# Patient Record
Sex: FEMALE | Race: OTHER | Employment: UNEMPLOYED | URBAN - METROPOLITAN AREA
[De-identification: names, ages, dates, MRNs, and addresses within clinical notes are randomized per-mention and may not be internally consistent; named-entity substitution may affect disease eponyms.]

---

## 2020-07-04 ENCOUNTER — HOSPITAL ENCOUNTER (INPATIENT)
Age: 40
LOS: 3 days | Discharge: HOME | DRG: 177 | End: 2020-07-07
Attending: EMERGENCY MEDICINE | Admitting: INTERNAL MEDICINE
Payer: SELF-PAY

## 2020-07-04 ENCOUNTER — APPOINTMENT (OUTPATIENT)
Dept: GENERAL RADIOLOGY | Age: 40
DRG: 177 | End: 2020-07-04
Attending: PHYSICIAN ASSISTANT
Payer: SELF-PAY

## 2020-07-04 DIAGNOSIS — U07.1 COVID-19 VIRUS INFECTION: ICD-10-CM

## 2020-07-04 DIAGNOSIS — Z20.822 SUSPECTED COVID-19 VIRUS INFECTION: Primary | ICD-10-CM

## 2020-07-04 DIAGNOSIS — R09.02 HYPOXIA: ICD-10-CM

## 2020-07-04 LAB
ALBUMIN SERPL-MCNC: 3.6 G/DL (ref 3.5–5)
ALBUMIN/GLOB SERPL: 0.8 {RATIO} (ref 1.1–2.2)
ALP SERPL-CCNC: 244 U/L (ref 45–117)
ALT SERPL-CCNC: 105 U/L (ref 12–78)
ANION GAP SERPL CALC-SCNC: 6 MMOL/L (ref 5–15)
APTT PPP: 30.2 SEC (ref 22.1–32)
AST SERPL-CCNC: 75 U/L (ref 15–37)
BASOPHILS # BLD: 0 K/UL (ref 0–0.1)
BASOPHILS NFR BLD: 0 % (ref 0–1)
BILIRUB SERPL-MCNC: 0.4 MG/DL (ref 0.2–1)
BUN SERPL-MCNC: 8 MG/DL (ref 6–20)
BUN/CREAT SERPL: 11 (ref 12–20)
CALCIUM SERPL-MCNC: 8.3 MG/DL (ref 8.5–10.1)
CHLORIDE SERPL-SCNC: 105 MMOL/L (ref 97–108)
CO2 SERPL-SCNC: 27 MMOL/L (ref 21–32)
COMMENT, HOLDF: NORMAL
COVID-19 RAPID TEST, COVR: DETECTED
CREAT SERPL-MCNC: 0.73 MG/DL (ref 0.55–1.02)
CRP SERPL-MCNC: 11.7 MG/DL (ref 0–0.6)
D DIMER PPP FEU-MCNC: 0.93 MG/L FEU (ref 0–0.65)
DIFFERENTIAL METHOD BLD: ABNORMAL
EOSINOPHIL # BLD: 0 K/UL (ref 0–0.4)
EOSINOPHIL NFR BLD: 0 % (ref 0–7)
ERYTHROCYTE [DISTWIDTH] IN BLOOD BY AUTOMATED COUNT: 12.9 % (ref 11.5–14.5)
FERRITIN SERPL-MCNC: 265 NG/ML (ref 26–388)
FIBRINOGEN PPP-MCNC: 574 MG/DL (ref 200–475)
GLOBULIN SER CALC-MCNC: 4.4 G/DL (ref 2–4)
GLUCOSE SERPL-MCNC: 117 MG/DL (ref 65–100)
HCT VFR BLD AUTO: 44.1 % (ref 35–47)
HGB BLD-MCNC: 13.9 G/DL (ref 11.5–16)
IMM GRANULOCYTES # BLD AUTO: 0.1 K/UL (ref 0–0.04)
IMM GRANULOCYTES NFR BLD AUTO: 1 % (ref 0–0.5)
INR PPP: 1 (ref 0.9–1.1)
LACTATE SERPL-SCNC: 0.8 MMOL/L (ref 0.4–2)
LDH SERPL L TO P-CCNC: 306 U/L (ref 81–246)
LYMPHOCYTES # BLD: 1.2 K/UL (ref 0.8–3.5)
LYMPHOCYTES NFR BLD: 13 % (ref 12–49)
MAGNESIUM SERPL-MCNC: 2.2 MG/DL (ref 1.6–2.4)
MCH RBC QN AUTO: 28.7 PG (ref 26–34)
MCHC RBC AUTO-ENTMCNC: 31.5 G/DL (ref 30–36.5)
MCV RBC AUTO: 91.1 FL (ref 80–99)
MONOCYTES # BLD: 0.5 K/UL (ref 0–1)
MONOCYTES NFR BLD: 5 % (ref 5–13)
NEUTS SEG # BLD: 7.6 K/UL (ref 1.8–8)
NEUTS SEG NFR BLD: 81 % (ref 32–75)
NRBC # BLD: 0 K/UL (ref 0–0.01)
NRBC BLD-RTO: 0 PER 100 WBC
PLATELET # BLD AUTO: 305 K/UL (ref 150–400)
PMV BLD AUTO: 8.8 FL (ref 8.9–12.9)
POTASSIUM SERPL-SCNC: 3.5 MMOL/L (ref 3.5–5.1)
PROT SERPL-MCNC: 8 G/DL (ref 6.4–8.2)
PROTHROMBIN TIME: 10.3 SEC (ref 9–11.1)
RBC # BLD AUTO: 4.84 M/UL (ref 3.8–5.2)
SAMPLES BEING HELD,HOLD: NORMAL
SODIUM SERPL-SCNC: 138 MMOL/L (ref 136–145)
SOURCE, COVRS: ABNORMAL
SPECIMEN SOURCE, FCOV2M: ABNORMAL
THERAPEUTIC RANGE,PTTT: NORMAL SECS (ref 58–77)
TROPONIN I SERPL-MCNC: <0.05 NG/ML
WBC # BLD AUTO: 9.3 K/UL (ref 3.6–11)

## 2020-07-04 PROCEDURE — 85384 FIBRINOGEN ACTIVITY: CPT

## 2020-07-04 PROCEDURE — 80053 COMPREHEN METABOLIC PANEL: CPT

## 2020-07-04 PROCEDURE — 99284 EMERGENCY DEPT VISIT MOD MDM: CPT

## 2020-07-04 PROCEDURE — 71045 X-RAY EXAM CHEST 1 VIEW: CPT

## 2020-07-04 PROCEDURE — 85610 PROTHROMBIN TIME: CPT

## 2020-07-04 PROCEDURE — 85025 COMPLETE CBC W/AUTO DIFF WBC: CPT

## 2020-07-04 PROCEDURE — 85730 THROMBOPLASTIN TIME PARTIAL: CPT

## 2020-07-04 PROCEDURE — 83615 LACTATE (LD) (LDH) ENZYME: CPT

## 2020-07-04 PROCEDURE — 85379 FIBRIN DEGRADATION QUANT: CPT

## 2020-07-04 PROCEDURE — 93005 ELECTROCARDIOGRAM TRACING: CPT

## 2020-07-04 PROCEDURE — 84484 ASSAY OF TROPONIN QUANT: CPT

## 2020-07-04 PROCEDURE — 86140 C-REACTIVE PROTEIN: CPT

## 2020-07-04 PROCEDURE — 87635 SARS-COV-2 COVID-19 AMP PRB: CPT

## 2020-07-04 PROCEDURE — 36415 COLL VENOUS BLD VENIPUNCTURE: CPT

## 2020-07-04 PROCEDURE — 83735 ASSAY OF MAGNESIUM: CPT

## 2020-07-04 PROCEDURE — 65270000029 HC RM PRIVATE

## 2020-07-04 PROCEDURE — 83605 ASSAY OF LACTIC ACID: CPT

## 2020-07-04 PROCEDURE — 82728 ASSAY OF FERRITIN: CPT

## 2020-07-04 RX ORDER — ACETAMINOPHEN 650 MG/1
650 SUPPOSITORY RECTAL
Status: DISCONTINUED | OUTPATIENT
Start: 2020-07-04 | End: 2020-07-07 | Stop reason: HOSPADM

## 2020-07-04 RX ORDER — SODIUM CHLORIDE 0.9 % (FLUSH) 0.9 %
5-40 SYRINGE (ML) INJECTION EVERY 8 HOURS
Status: DISCONTINUED | OUTPATIENT
Start: 2020-07-04 | End: 2020-07-07 | Stop reason: HOSPADM

## 2020-07-04 RX ORDER — ACETAMINOPHEN 325 MG/1
650 TABLET ORAL
Status: DISCONTINUED | OUTPATIENT
Start: 2020-07-04 | End: 2020-07-07 | Stop reason: HOSPADM

## 2020-07-04 RX ORDER — SODIUM CHLORIDE 0.9 % (FLUSH) 0.9 %
5-40 SYRINGE (ML) INJECTION AS NEEDED
Status: DISCONTINUED | OUTPATIENT
Start: 2020-07-04 | End: 2020-07-07 | Stop reason: HOSPADM

## 2020-07-05 LAB
ALBUMIN SERPL-MCNC: 3 G/DL (ref 3.5–5)
ALBUMIN/GLOB SERPL: 0.7 {RATIO} (ref 1.1–2.2)
ALP SERPL-CCNC: 210 U/L (ref 45–117)
ALT SERPL-CCNC: 86 U/L (ref 12–78)
ANION GAP SERPL CALC-SCNC: 8 MMOL/L (ref 5–15)
APTT PPP: 31.3 SEC (ref 22.1–32)
AST SERPL-CCNC: 56 U/L (ref 15–37)
BASOPHILS # BLD: 0 K/UL (ref 0–0.1)
BASOPHILS NFR BLD: 0 % (ref 0–1)
BILIRUB SERPL-MCNC: 0.4 MG/DL (ref 0.2–1)
BUN SERPL-MCNC: 6 MG/DL (ref 6–20)
BUN/CREAT SERPL: 11 (ref 12–20)
CALCIUM SERPL-MCNC: 8.3 MG/DL (ref 8.5–10.1)
CHLORIDE SERPL-SCNC: 107 MMOL/L (ref 97–108)
CK SERPL-CCNC: 61 U/L (ref 26–192)
CO2 SERPL-SCNC: 23 MMOL/L (ref 21–32)
CREAT SERPL-MCNC: 0.55 MG/DL (ref 0.55–1.02)
D DIMER PPP FEU-MCNC: 0.69 MG/L FEU (ref 0–0.65)
DIFFERENTIAL METHOD BLD: ABNORMAL
EOSINOPHIL # BLD: 0 K/UL (ref 0–0.4)
EOSINOPHIL NFR BLD: 0 % (ref 0–7)
ERYTHROCYTE [DISTWIDTH] IN BLOOD BY AUTOMATED COUNT: 12.9 % (ref 11.5–14.5)
FERRITIN SERPL-MCNC: 231 NG/ML (ref 26–388)
FIBRINOGEN PPP-MCNC: 502 MG/DL (ref 200–475)
GLOBULIN SER CALC-MCNC: 4.1 G/DL (ref 2–4)
GLUCOSE SERPL-MCNC: 100 MG/DL (ref 65–100)
HCT VFR BLD AUTO: 38.8 % (ref 35–47)
HGB BLD-MCNC: 12.4 G/DL (ref 11.5–16)
IMM GRANULOCYTES # BLD AUTO: 0 K/UL (ref 0–0.04)
IMM GRANULOCYTES NFR BLD AUTO: 1 % (ref 0–0.5)
INR PPP: 1 (ref 0.9–1.1)
LYMPHOCYTES # BLD: 1.3 K/UL (ref 0.8–3.5)
LYMPHOCYTES NFR BLD: 19 % (ref 12–49)
MCH RBC QN AUTO: 29.2 PG (ref 26–34)
MCHC RBC AUTO-ENTMCNC: 32 G/DL (ref 30–36.5)
MCV RBC AUTO: 91.5 FL (ref 80–99)
MONOCYTES # BLD: 0.4 K/UL (ref 0–1)
MONOCYTES NFR BLD: 5 % (ref 5–13)
NEUTS SEG # BLD: 5.2 K/UL (ref 1.8–8)
NEUTS SEG NFR BLD: 75 % (ref 32–75)
NRBC # BLD: 0 K/UL (ref 0–0.01)
NRBC BLD-RTO: 0 PER 100 WBC
PLATELET # BLD AUTO: 279 K/UL (ref 150–400)
PMV BLD AUTO: 8.7 FL (ref 8.9–12.9)
POTASSIUM SERPL-SCNC: 3.6 MMOL/L (ref 3.5–5.1)
PROCALCITONIN SERPL-MCNC: 0.24 NG/ML
PROT SERPL-MCNC: 7.1 G/DL (ref 6.4–8.2)
PROTHROMBIN TIME: 10.5 SEC (ref 9–11.1)
RBC # BLD AUTO: 4.24 M/UL (ref 3.8–5.2)
SODIUM SERPL-SCNC: 138 MMOL/L (ref 136–145)
THERAPEUTIC RANGE,PTTT: NORMAL SECS (ref 58–77)
WBC # BLD AUTO: 7 K/UL (ref 3.6–11)

## 2020-07-05 PROCEDURE — 85384 FIBRINOGEN ACTIVITY: CPT

## 2020-07-05 PROCEDURE — 85610 PROTHROMBIN TIME: CPT

## 2020-07-05 PROCEDURE — 65270000029 HC RM PRIVATE

## 2020-07-05 PROCEDURE — 74011250637 HC RX REV CODE- 250/637: Performed by: INTERNAL MEDICINE

## 2020-07-05 PROCEDURE — 84145 PROCALCITONIN (PCT): CPT

## 2020-07-05 PROCEDURE — 80053 COMPREHEN METABOLIC PANEL: CPT

## 2020-07-05 PROCEDURE — 74011250636 HC RX REV CODE- 250/636: Performed by: HOSPITALIST

## 2020-07-05 PROCEDURE — 82728 ASSAY OF FERRITIN: CPT

## 2020-07-05 PROCEDURE — 36415 COLL VENOUS BLD VENIPUNCTURE: CPT

## 2020-07-05 PROCEDURE — 85730 THROMBOPLASTIN TIME PARTIAL: CPT

## 2020-07-05 PROCEDURE — 74011250636 HC RX REV CODE- 250/636: Performed by: INTERNAL MEDICINE

## 2020-07-05 PROCEDURE — 85025 COMPLETE CBC W/AUTO DIFF WBC: CPT

## 2020-07-05 PROCEDURE — 85379 FIBRIN DEGRADATION QUANT: CPT

## 2020-07-05 PROCEDURE — 82550 ASSAY OF CK (CPK): CPT

## 2020-07-05 RX ORDER — ZINC SULFATE 50(220)MG
1 CAPSULE ORAL DAILY
Status: DISCONTINUED | OUTPATIENT
Start: 2020-07-05 | End: 2020-07-07 | Stop reason: HOSPADM

## 2020-07-05 RX ORDER — ENOXAPARIN SODIUM 100 MG/ML
30 INJECTION SUBCUTANEOUS EVERY 12 HOURS
Status: DISCONTINUED | OUTPATIENT
Start: 2020-07-05 | End: 2020-07-07 | Stop reason: HOSPADM

## 2020-07-05 RX ORDER — ASCORBIC ACID 500 MG
500 TABLET ORAL 2 TIMES DAILY
Status: DISCONTINUED | OUTPATIENT
Start: 2020-07-05 | End: 2020-07-07 | Stop reason: HOSPADM

## 2020-07-05 RX ADMIN — Medication 10 ML: at 01:26

## 2020-07-05 RX ADMIN — ENOXAPARIN SODIUM 30 MG: 30 INJECTION SUBCUTANEOUS at 08:57

## 2020-07-05 RX ADMIN — ENOXAPARIN SODIUM 30 MG: 30 INJECTION SUBCUTANEOUS at 19:31

## 2020-07-05 RX ADMIN — ACETAMINOPHEN 650 MG: 325 TABLET ORAL at 22:28

## 2020-07-05 RX ADMIN — Medication 10 ML: at 22:29

## 2020-07-05 RX ADMIN — Medication 500 MG: at 17:11

## 2020-07-05 RX ADMIN — Medication 10 ML: at 14:41

## 2020-07-05 RX ADMIN — ACETAMINOPHEN 650 MG: 325 TABLET ORAL at 06:41

## 2020-07-05 RX ADMIN — ACETAMINOPHEN 650 MG: 325 TABLET ORAL at 12:19

## 2020-07-05 RX ADMIN — Medication 10 ML: at 06:41

## 2020-07-05 RX ADMIN — Medication 500 MG: at 08:57

## 2020-07-05 RX ADMIN — AZITHROMYCIN MONOHYDRATE 500 MG: 500 INJECTION, POWDER, LYOPHILIZED, FOR SOLUTION INTRAVENOUS at 01:26

## 2020-07-05 RX ADMIN — ACETAMINOPHEN 650 MG: 325 TABLET ORAL at 01:54

## 2020-07-05 RX ADMIN — ZINC SULFATE 220 MG (50 MG) CAPSULE 1 CAPSULE: CAPSULE at 08:57

## 2020-07-05 NOTE — PROGRESS NOTES
Problem: Pain - Acute  Goal: *Ability to perform ADLs and demonstrates progressive mobility and function  Outcome: Progressing Towards Goal     Problem: Gas Exchange - Impaired  Goal: Absence of hypoxia  Outcome: Progressing Towards Goal     Problem: Breathing Pattern - Ineffective  Goal: Ability to achieve and maintain a regular respiratory rate  Outcome: Progressing Towards Goal     Problem:  Body Temperature -  Risk of, Imbalanced  Goal: Ability to maintain a body temperature within defined limits  Outcome: Progressing Towards Goal     Problem: Loneliness or Risk for Loneliness  Goal: Demonstrate positive use of time alone when socialization is not possible  Outcome: Progressing Towards Goal     Problem: Fatigue  Goal: Verbalize increase energy and improved vitality  Outcome: Progressing Towards Goal

## 2020-07-05 NOTE — ED PROVIDER NOTES
Janeen Bell is a 38 y.o. female  who presents by private vehicle to ER with c/o Patient presents with:  Shortness of Breath  Fatigue  Patient presents with complaints of shortness of breath and fatigue. Patient states that 8 days ago with intermittent fever, fatigue and dizziness. Patient recently has been around her mother who was discharged from hospital for [place]. She specifically denies any fevers, chills, nausea, vomiting, chest pain,  headache, rash, diarrhea, abdominal pain, urinary/bowel changes, sweating or weight loss. PCP: No primary care provider on file. PMHx significant for: No past medical history on file. PSHx significant for: No past surgical history on file. Social Hx: Tobacco use: Social History    Tobacco Use      Smoking status: Never Smoker      Smokeless tobacco: Never Used  ; EtOH use: The patient states she drinks 0 per week.; Illicit Drug use: Allergies:  No Known Allergies    There are no other complaints, changes or physical findings at this time. No past medical history on file. No past surgical history on file. No family history on file.     Social History     Socioeconomic History    Marital status: Not on file     Spouse name: Not on file    Number of children: Not on file    Years of education: Not on file    Highest education level: Not on file   Occupational History    Not on file   Social Needs    Financial resource strain: Not on file    Food insecurity     Worry: Not on file     Inability: Not on file    Transportation needs     Medical: Not on file     Non-medical: Not on file   Tobacco Use    Smoking status: Never Smoker    Smokeless tobacco: Never Used   Substance and Sexual Activity    Alcohol use: Not Currently    Drug use: Not Currently    Sexual activity: Not on file   Lifestyle    Physical activity     Days per week: Not on file     Minutes per session: Not on file    Stress: Not on file   Relationships    Social connections     Talks on phone: Not on file     Gets together: Not on file     Attends Yarsani service: Not on file     Active member of club or organization: Not on file     Attends meetings of clubs or organizations: Not on file     Relationship status: Not on file    Intimate partner violence     Fear of current or ex partner: Not on file     Emotionally abused: Not on file     Physically abused: Not on file     Forced sexual activity: Not on file   Other Topics Concern    Not on file   Social History Narrative    Not on file         ALLERGIES: Patient has no known allergies. Review of Systems   Constitutional: Positive for activity change and fatigue. Negative for chills and fever. HENT: Negative for congestion, rhinorrhea and sore throat. Respiratory: Positive for cough and shortness of breath. Cardiovascular: Negative for chest pain and leg swelling. Gastrointestinal: Negative for abdominal pain, diarrhea, nausea and vomiting. Genitourinary: Negative for dysuria, vaginal bleeding and vaginal discharge. Musculoskeletal: Positive for myalgias. Negative for arthralgias. Neurological: Negative for dizziness. Psychiatric/Behavioral: Negative for confusion. All other systems reviewed and are negative. Vitals:    07/04/20 2137   BP: 105/70   Pulse: 82   Resp: 24   Temp: 97.7 °F (36.5 °C)   SpO2: 90%            Physical Exam  Constitutional:       Appearance: Normal appearance. She is well-developed. HENT:      Head: Normocephalic and atraumatic. Right Ear: External ear normal.      Left Ear: External ear normal.      Nose: Nose normal.      Mouth/Throat:      Pharynx: No oropharyngeal exudate. Eyes:      General: Lids are normal.         Right eye: No discharge. Left eye: No discharge. Conjunctiva/sclera: Conjunctivae normal.   Neck:      Musculoskeletal: Normal range of motion. Thyroid: No thyromegaly. Trachea: No tracheal deviation. Cardiovascular:      Rate and Rhythm: Normal rate and regular rhythm. Heart sounds: Normal heart sounds. Pulmonary:      Effort: Pulmonary effort is normal. Tachypnea present. No respiratory distress or retractions. Breath sounds: Normal breath sounds. No stridor or transmitted upper airway sounds. No decreased breath sounds or wheezing. Abdominal:      General: Bowel sounds are normal.      Palpations: Abdomen is soft. Musculoskeletal: Normal range of motion. Skin:     General: Skin is warm and dry. Neurological:      Mental Status: She is alert and oriented to person, place, and time. Psychiatric:         Judgment: Judgment normal.          MDM       Procedures                   Hospitalist Perfect Serve for Admission  10:57 PM    ED Room Number: ER14/14  Patient Name and age:  Ethel Arvizu 38 y.o.  female  Working Diagnosis:   1. Suspected COVID-19 virus infection    2.  Hypoxia        COVID-19 Suspicion:  yes    Code Status:  Full Code  Readmission: no  Isolation Requirements:  yes  Recommended Level of Care:  telemetry  Department:[facility] Adult ED - 21   Other:  Rapid test, COVID positive, patient diaphoretic and hypoxic on arrival.

## 2020-07-05 NOTE — ED TRIAGE NOTES
Triage:  Pt to ED due to worsening SOB and fatigue. Pt states the SOB has been ongoing for 8 days. Pt states intermittent fever, fatigue, and dizziness. Pt states she has recently been around her mother who was recently discharged from the hospital who was diagnosed with Covid 19 on admission.

## 2020-07-05 NOTE — PROGRESS NOTES
[facility] Adult  Hospitalist Group                                                                                          Hospitalist Progress Note  Katt Varma MD  Answering service: [phone] from in house phone        Date of Service:  2020  NAME:  Dorann Halsted  :  1980  MRN:  [id number]      Admission Summary:     Dorann Halsted is a 38 y.o. female with no significant past medical history presented emergency room complaining of shortness of breath, increased fatigue chills and chest heaviness for the last 24 hours. Patient denies any fever, nausea, vomiting or abdominal pain. 8 days. Patient also reports having non-productive cough that has worsened. Her mother was recently diagnosed with COVID-19 infection. Patient came to the emergency room today for evaluation and she was found to be hypoxic with O2 sats of 91% on room air and dropped to the 80s with ambulation. She had a rapid coffee test in the ED that was positive. On chest x-ray she was found to have some opacities suggestive of pneumonia. Also  has been having similar symptoms. He is currently in the ED to be evaluated. Interval history / Subjective:     She said she has exertional dyspnea and dry cough, no chest pain     Assessment & Plan:     Acute hypoxic respiratory failure due to pneumonia, COVID-19 infection  -COVID-19 test positive  -continue ceftriaxone, azithromycin, vitamin c and zinc, monitor pulse ox  -on droplet and contact isolation  -SpO2 96-99% on RA  -chest x ray diffuse interstitial infiltrates. Consider atypical viral processes.   -afebrile, no leukocytosis   -troponin and ferritin normal  -d dimer 0.69, fibrinogen 502, CRP 11.7,   -continue monitoring pulse ox            Code status: Full Code   DVT prophylaxis: Lovenox    Care Plan discussed with: Patient/Family and Nurse  Anticipated Disposition: Home w/Family  Anticipated Discharge: 24 hours to 50 hours     Hospital Problems  Never Reviewed          Codes Class Noted POA    Hypoxia ICD-10-CM: R09.02  ICD-9-CM: 799.02  7/4/2020 Unknown        COVID-19 virus infection ICD-10-CM: U07.1  ICD-9-CM: 079.89  7/4/2020 Unknown              Vital Signs:    Last 24hrs VS reviewed since prior progress note. Most recent are:  Visit Vitals  /67 (BP 1 Location: Left arm, BP Patient Position: At rest)   Pulse 69   Temp 99.1 °F (37.3 °C)   Resp 22   Wt 68.5 kg (151 lb)   SpO2 96%       No intake or output data in the 24 hours ending 07/05/20 1243     Physical Examination:             Constitutional:  No acute distress, cooperative, pleasant    ENT:  Oral mucosa moist, oropharynx benign. Resp:  Decrease bronchial breath sound bilaterally. No wheezing/rhonchi/rales. No accessory muscle use   CV:  Regular rhythm, normal rate, no murmurs, gallops, rubs    GI:  Soft, non distended, non tender. normoactive bowel sounds, no hepatosplenomegaly     Musculoskeletal:  No edema     Neurologic:  Moves all extremities.   AAOx3, CN II-XII reviewed     Skin:  Good turgor, no rashes or ulcers       Data Review:    Review and/or order of clinical lab test  Review and/or order of tests in the radiology section of CPT  Review and/or order of tests in the medicine section of CPT      Labs:     Recent Labs     07/05/20 0413 07/04/20 2237   WBC 7.0 9.3   HGB 12.4 13.9   HCT 38.8 44.1    305     Recent Labs     07/05/20 0413 07/04/20 2237    138   K 3.6 3.5    105   CO2 23 27   BUN 6 8   CREA 0.55 0.73    117*   CA 8.3* 8.3*   MG  --  2.2     Recent Labs     07/05/20 0413 07/04/20 2237   ALT 86* 105*   * 244*   TBILI 0.4 0.4   TP 7.1 8.0   ALB 3.0* 3.6   GLOB 4.1* 4.4*     Recent Labs     07/05/20 0413 07/04/20 2237   INR 1.0 1.0   PTP 10.5 10.3   APTT 31.3 30.2      Recent Labs     07/05/20  0413 07/04/20  2237   FERR 231 265      No results found for: FOL, RBCF   No results for input(s): PH, PCO2, PO2 in the last 72 hours.   Recent Labs     07/05/20  0413 07/04/20  4787   CPK 61  --    TROIQ  --  <0.05     No results found for: CHOL, CHOLX, CHLST, CHOLV, HDL, HDLP, LDL, LDLC, DLDLP, TGLX, TRIGL, TRIGP, CHHD, CHHDX  No results found for: GLUCPOC  No results found for: COLOR, APPRN, SPGRU, REFSG, ALAN, PROTU, GLUCU, KETU, BILU, UROU, AMARA, LEUKU, GLUKE, EPSU, BACTU, WBCU, RBCU, CASTS, UCRY      Medications Reviewed:     Current Facility-Administered Medications   Medication Dose Route Frequency    azithromycin (ZITHROMAX) 500 mg in 0.9% sodium chloride (MBP/ADV) 250 mL  500 mg IntraVENous Q24H    ascorbic acid (vitamin C) (VITAMIN C) tablet 500 mg  500 mg Oral BID    zinc sulfate (ZINCATE) 220 (50) mg capsule 1 Cap  1 Cap Oral DAILY    enoxaparin (LOVENOX) injection 30 mg  30 mg SubCUTAneous Q12H    sodium chloride (NS) flush 5-40 mL  5-40 mL IntraVENous Q8H    sodium chloride (NS) flush 5-40 mL  5-40 mL IntraVENous PRN    acetaminophen (TYLENOL) tablet 650 mg  650 mg Oral Q6H PRN    Or    acetaminophen (TYLENOL) suppository 650 mg  650 mg Rectal Q6H PRN     ______________________________________________________________________  EXPECTED LENGTH OF STAY: - - -  ACTUAL LENGTH OF STAY:          1                 Brijesh Klein MD

## 2020-07-05 NOTE — H&P
[address] [facility] Adult  Hospitalist Group  History and Physical    Primary Care Provider: None  Date of Service:  7/5/2020    Subjective:     Zeenat Gaxiola is a 38 y.o. female with no significant past medical history presented emergency room complaining of shortness of breath, increased fatigue chills and chest heaviness for the last 24 hours. Patient denies any fever, nausea, vomiting or abdominal pain. 8 days. Patient also reports having non-productive cough that has worsened. Her mother was recently diagnosed with COVID-19 infection. Patient came to the emergency room today for evaluation and she was found to be hypoxic with O2 sats of 91% on room air and dropped to the 80s with ambulation. She had a rapid coffee test in the ED that was positive. On chest x-ray she was found to have some opacities suggestive of pneumonia. Also  has been having similar symptoms. He is currently in the ED to be evaluated. Review of Systems:    Review of Systems   Constitutional: Positive for chills and malaise/fatigue. Negative for fever and weight loss. HENT: Negative for congestion, ear discharge and hearing loss. Eyes: Negative for blurred vision and double vision. Respiratory: Positive for cough and shortness of breath. Negative for sputum production and wheezing. Cardiovascular: Positive for chest pain. Negative for palpitations, orthopnea, leg swelling and PND. Gastrointestinal: Negative for abdominal pain, constipation, diarrhea, melena, nausea and vomiting. Genitourinary: Negative for dysuria, frequency and urgency. Musculoskeletal: Negative for back pain, joint pain and myalgias. Skin: Negative for itching and rash. Neurological: Negative for dizziness, sensory change, speech change, focal weakness, weakness and headaches. Endo/Heme/Allergies: Negative for polydipsia. Does not bruise/bleed easily.        Past medical history: None    Past surgical history: None    Home medication: None    No Known Allergies     Family medical history: None except for mom who was just recently diagnosed with COVID-19 infection. SOCIAL HISTORY:  Patient resides at home with . Patient ambulates independently. Smoking history: Never. Alcohol history: None. Objective:       Physical Exam:   Patient Vitals for the past 12 hrs:   Temp Pulse Resp BP SpO2   07/05/20 0142 98.9 °F (37.2 °C) 75 24 123/77 96 %   07/05/20 0030  71 26 121/69 99 %   07/05/20 0000  92 23 112/76 (!) 89 %   07/04/20 2330  77 28 116/74 91 %   07/04/20 2300  77 28 110/73 91 %   07/04/20 2137 97.7 °F (36.5 °C) 82 24 105/70 90 %     GEN APPEARANCE: Patient resting in bed in NAD  HEENT: Conjunctiva Clear  CVS: RRR, S1, S2; No M/G/R  LUNGS: Diffuse ronchi. Labored breathing. ABD: Soft; No TTP; + Normoactive BS  EXT: WWP, no edema   Skin exam: No gross lesions noted on exposed skin surfaces  MENTAL STATUS: Answers questions appropriately, responds to commands.    NEURO:  No gross motor or sensory deficits      Data Review:   Recent Results (from the past 24 hour(s))   EKG, 12 LEAD, INITIAL    Collection Time: 07/04/20 10:28 PM   Result Value Ref Range    Ventricular Rate 85 BPM    Atrial Rate 85 BPM    P-R Interval 156 ms    QRS Duration 94 ms    Q-T Interval 388 ms    QTC Calculation (Bezet) 461 ms    Calculated P Axis 38 degrees    Calculated R Axis 78 degrees    Calculated T Axis 9 degrees    Diagnosis       Normal sinus rhythm  Nonspecific T wave abnormality  Prolonged QT  No previous ECGs available     SARS-COV-2    Collection Time: 07/04/20 10:37 PM   Result Value Ref Range    Specimen source Nasopharyngeal      Specimen source Nasopharyngeal      COVID-19 rapid test Detected (A) NOTD     LACTIC ACID    Collection Time: 07/04/20 10:37 PM   Result Value Ref Range    Lactic acid 0.8 0.4 - 2.0 MMOL/L   TROPONIN I    Collection Time: 07/04/20 10:37 PM   Result Value Ref Range    Troponin-I, Qt. <0.05 <0.05 ng/mL   D DIMER    Collection Time: 07/04/20 10:37 PM   Result Value Ref Range    D-dimer 0.93 (H) 0.00 - 0.65 mg/L FEU   C REACTIVE PROTEIN, QT    Collection Time: 07/04/20 10:37 PM   Result Value Ref Range    C-Reactive protein 11.70 (H) 0.00 - 0.60 mg/dL   LD    Collection Time: 07/04/20 10:37 PM   Result Value Ref Range     (H) 81 - 246 U/L   FERRITIN    Collection Time: 07/04/20 10:37 PM   Result Value Ref Range    Ferritin 265 26 - 388 NG/ML   FIBRINOGEN    Collection Time: 07/04/20 10:37 PM   Result Value Ref Range    Fibrinogen 574 (H) 200 - 475 mg/dL   PTT    Collection Time: 07/04/20 10:37 PM   Result Value Ref Range    aPTT 30.2 22.1 - 32.0 sec    aPTT, therapeutic range     58.0 - 77.0 SECS   PROTHROMBIN TIME + INR    Collection Time: 07/04/20 10:37 PM   Result Value Ref Range    INR 1.0 0.9 - 1.1      Prothrombin time 10.3 9.0 - 11.1 sec   MAGNESIUM    Collection Time: 07/04/20 10:37 PM   Result Value Ref Range    Magnesium 2.2 1.6 - 2.4 mg/dL   METABOLIC PANEL, COMPREHENSIVE    Collection Time: 07/04/20 10:37 PM   Result Value Ref Range    Sodium 138 136 - 145 mmol/L    Potassium 3.5 3.5 - 5.1 mmol/L    Chloride 105 97 - 108 mmol/L    CO2 27 21 - 32 mmol/L    Anion gap 6 5 - 15 mmol/L    Glucose 117 (H) 65 - 100 mg/dL    BUN 8 6 - 20 MG/DL    Creatinine 0.73 0.55 - 1.02 MG/DL    BUN/Creatinine ratio 11 (L) 12 - 20      GFR est AA >60 >60 ml/min/1.73m2    GFR est non-AA >60 >60 ml/min/1.73m2    Calcium 8.3 (L) 8.5 - 10.1 MG/DL    Bilirubin, total 0.4 0.2 - 1.0 MG/DL    ALT (SGPT) 105 (H) 12 - 78 U/L    AST (SGOT) 75 (H) 15 - 37 U/L    Alk.  phosphatase 244 (H) 45 - 117 U/L    Protein, total 8.0 6.4 - 8.2 g/dL    Albumin 3.6 3.5 - 5.0 g/dL    Globulin 4.4 (H) 2.0 - 4.0 g/dL    A-G Ratio 0.8 (L) 1.1 - 2.2     CBC WITH AUTOMATED DIFF    Collection Time: 07/04/20 10:37 PM   Result Value Ref Range    WBC 9.3 3.6 - 11.0 K/uL    RBC 4.84 3.80 - 5.20 M/uL    HGB 13.9 11.5 - 16.0 g/dL    HCT 44.1 35.0 - 47.0 %    MCV 91.1 80.0 - 99.0 FL    MCH 28.7 26.0 - 34.0 PG    MCHC 31.5 30.0 - 36.5 g/dL    RDW 12.9 11.5 - 14.5 %    PLATELET 041 784 - 331 K/uL    MPV 8.8 (L) 8.9 - 12.9 FL    NRBC 0.0 0  WBC    ABSOLUTE NRBC 0.00 0.00 - 0.01 K/uL    NEUTROPHILS 81 (H) 32 - 75 %    LYMPHOCYTES 13 12 - 49 %    MONOCYTES 5 5 - 13 %    EOSINOPHILS 0 0 - 7 %    BASOPHILS 0 0 - 1 %    IMMATURE GRANULOCYTES 1 (H) 0.0 - 0.5 %    ABS. NEUTROPHILS 7.6 1.8 - 8.0 K/UL    ABS. LYMPHOCYTES 1.2 0.8 - 3.5 K/UL    ABS. MONOCYTES 0.5 0.0 - 1.0 K/UL    ABS. EOSINOPHILS 0.0 0.0 - 0.4 K/UL    ABS. BASOPHILS 0.0 0.0 - 0.1 K/UL    ABS. IMM. GRANS. 0.1 (H) 0.00 - 0.04 K/UL    DF AUTOMATED     SAMPLES BEING HELD    Collection Time: 07/04/20 10:37 PM   Result Value Ref Range    SAMPLES BEING HELD 1BC(LAV),1BC(ROSE)     COMMENT        Add-on orders for these samples will be processed based on acceptable specimen integrity and analyte stability, which may vary by analyte. Xr Chest Port    Result Date: 7/4/2020  Impression: Diffuse interstitial infiltrates. Consider atypical viral processes. Assessment:     Active Problems:    Hypoxia (7/4/2020)      COVID-19 virus infection (7/4/2020)        Plan:     1. Acute Hypoxic respiratory failure: due to COVID -19 PNA  - COVID rapid test in ED positive  - Chest x-ray- showed diffuse interstitial infiltrate. - Empiric antibiotic with azithromycin   - COVID test sent in the ED.  - Supplemental O2 to keep O2 saturations greater than 92%. - check ddimer, ferritin, procalcitonin in am.  - Placed on droplet precaution plus contact  - patient will benefit of convalescents plasma.      DVT prophylaxis: Lovenox  CODE STATUS: Full code    FUNCTIONAL STATUS PRIOR TO HOSPITALIZATION Ambulates Independently   Signed By: Jake Austin MD     July 5, 2020

## 2020-07-05 NOTE — ROUTINE PROCESS
TRANSFER - IN REPORT: 
 
Verbal report received from Claudia(name) on [place] [facility]  being received from ER(unit) for routine progression of care Report consisted of patients Situation, Background, Assessment and  
Recommendations(SBAR). Information from the following report(s) SBAR, Kardex, ED Summary, Intake/Output and Recent Results was reviewed with the receiving nurse. Opportunity for questions and clarification was provided. Assessment completed upon patients arrival to unit and care assumed.

## 2020-07-05 NOTE — ROUTINE PROCESS
TRANSFER - OUT REPORT: 
 
Verbal report given to CIPRIANO Billy on Dianna Santiago  being transferred to [facility] for routine progression of care Report consisted of patients Situation, Background, Assessment and  
Recommendations(SBAR). Information from the following report(s) SBAR, ED Summary, [facility] and Recent Results was reviewed with the receiving nurse. Lines:    
 
Opportunity for questions and clarification was provided.

## 2020-07-06 LAB
ALBUMIN SERPL-MCNC: 3 G/DL (ref 3.5–5)
ALBUMIN/GLOB SERPL: 0.8 {RATIO} (ref 1.1–2.2)
ALP SERPL-CCNC: 200 U/L (ref 45–117)
ALT SERPL-CCNC: 82 U/L (ref 12–78)
ANION GAP SERPL CALC-SCNC: 8 MMOL/L (ref 5–15)
APTT PPP: 25.7 SEC (ref 22.1–32)
AST SERPL-CCNC: 55 U/L (ref 15–37)
ATRIAL RATE: 85 BPM
BASOPHILS # BLD: 0 K/UL (ref 0–0.1)
BASOPHILS NFR BLD: 0 % (ref 0–1)
BILIRUB SERPL-MCNC: 0.3 MG/DL (ref 0.2–1)
BUN SERPL-MCNC: 8 MG/DL (ref 6–20)
BUN/CREAT SERPL: 15 (ref 12–20)
CALCIUM SERPL-MCNC: 8.3 MG/DL (ref 8.5–10.1)
CALCULATED P AXIS, ECG09: 38 DEGREES
CALCULATED R AXIS, ECG10: 78 DEGREES
CALCULATED T AXIS, ECG11: 9 DEGREES
CHLORIDE SERPL-SCNC: 109 MMOL/L (ref 97–108)
CO2 SERPL-SCNC: 24 MMOL/L (ref 21–32)
CREAT SERPL-MCNC: 0.55 MG/DL (ref 0.55–1.02)
D DIMER PPP FEU-MCNC: 0.63 MG/L FEU (ref 0–0.65)
DIAGNOSIS, 93000: NORMAL
DIFFERENTIAL METHOD BLD: ABNORMAL
EOSINOPHIL # BLD: 0 K/UL (ref 0–0.4)
EOSINOPHIL NFR BLD: 0 % (ref 0–7)
ERYTHROCYTE [DISTWIDTH] IN BLOOD BY AUTOMATED COUNT: 12.9 % (ref 11.5–14.5)
FERRITIN SERPL-MCNC: 235 NG/ML (ref 26–388)
FIBRINOGEN PPP-MCNC: 502 MG/DL (ref 200–475)
GLOBULIN SER CALC-MCNC: 3.9 G/DL (ref 2–4)
GLUCOSE SERPL-MCNC: 87 MG/DL (ref 65–100)
HCT VFR BLD AUTO: 38.7 % (ref 35–47)
HGB BLD-MCNC: 12.1 G/DL (ref 11.5–16)
IMM GRANULOCYTES # BLD AUTO: 0 K/UL
IMM GRANULOCYTES NFR BLD AUTO: 0 %
INR PPP: 1 (ref 0.9–1.1)
LYMPHOCYTES # BLD: 1.8 K/UL (ref 0.8–3.5)
LYMPHOCYTES NFR BLD: 54 % (ref 12–49)
MCH RBC QN AUTO: 28.7 PG (ref 26–34)
MCHC RBC AUTO-ENTMCNC: 31.3 G/DL (ref 30–36.5)
MCV RBC AUTO: 91.7 FL (ref 80–99)
MONOCYTES # BLD: 0.3 K/UL (ref 0–1)
MONOCYTES NFR BLD: 9 % (ref 5–13)
NEUTS BAND NFR BLD MANUAL: 1 % (ref 0–6)
NEUTS SEG # BLD: 1.3 K/UL (ref 1.8–8)
NEUTS SEG NFR BLD: 36 % (ref 32–75)
NRBC # BLD: 0 K/UL (ref 0–0.01)
NRBC BLD-RTO: 0 PER 100 WBC
P-R INTERVAL, ECG05: 156 MS
PLATELET # BLD AUTO: 325 K/UL (ref 150–400)
PMV BLD AUTO: 8.8 FL (ref 8.9–12.9)
POTASSIUM SERPL-SCNC: 3.7 MMOL/L (ref 3.5–5.1)
PROT SERPL-MCNC: 6.9 G/DL (ref 6.4–8.2)
PROTHROMBIN TIME: 10.3 SEC (ref 9–11.1)
Q-T INTERVAL, ECG07: 388 MS
QRS DURATION, ECG06: 94 MS
QTC CALCULATION (BEZET), ECG08: 461 MS
RBC # BLD AUTO: 4.22 M/UL (ref 3.8–5.2)
RBC MORPH BLD: ABNORMAL
SODIUM SERPL-SCNC: 141 MMOL/L (ref 136–145)
THERAPEUTIC RANGE,PTTT: NORMAL SECS (ref 58–77)
VENTRICULAR RATE, ECG03: 85 BPM
WBC # BLD AUTO: 3.4 K/UL (ref 3.6–11)

## 2020-07-06 PROCEDURE — 74011250637 HC RX REV CODE- 250/637: Performed by: INTERNAL MEDICINE

## 2020-07-06 PROCEDURE — 36415 COLL VENOUS BLD VENIPUNCTURE: CPT

## 2020-07-06 PROCEDURE — 85610 PROTHROMBIN TIME: CPT

## 2020-07-06 PROCEDURE — 80053 COMPREHEN METABOLIC PANEL: CPT

## 2020-07-06 PROCEDURE — 74011250636 HC RX REV CODE- 250/636: Performed by: HOSPITALIST

## 2020-07-06 PROCEDURE — 74011250636 HC RX REV CODE- 250/636: Performed by: INTERNAL MEDICINE

## 2020-07-06 PROCEDURE — 82728 ASSAY OF FERRITIN: CPT

## 2020-07-06 PROCEDURE — 85730 THROMBOPLASTIN TIME PARTIAL: CPT

## 2020-07-06 PROCEDURE — 65270000029 HC RM PRIVATE

## 2020-07-06 PROCEDURE — 85379 FIBRIN DEGRADATION QUANT: CPT

## 2020-07-06 PROCEDURE — 85384 FIBRINOGEN ACTIVITY: CPT

## 2020-07-06 PROCEDURE — 85025 COMPLETE CBC W/AUTO DIFF WBC: CPT

## 2020-07-06 RX ADMIN — Medication 500 MG: at 19:37

## 2020-07-06 RX ADMIN — ZINC SULFATE 220 MG (50 MG) CAPSULE 1 CAPSULE: CAPSULE at 09:47

## 2020-07-06 RX ADMIN — ENOXAPARIN SODIUM 30 MG: 30 INJECTION SUBCUTANEOUS at 07:18

## 2020-07-06 RX ADMIN — AZITHROMYCIN MONOHYDRATE 500 MG: 500 INJECTION, POWDER, LYOPHILIZED, FOR SOLUTION INTRAVENOUS at 00:38

## 2020-07-06 RX ADMIN — ENOXAPARIN SODIUM 30 MG: 30 INJECTION SUBCUTANEOUS at 19:36

## 2020-07-06 RX ADMIN — Medication 500 MG: at 09:47

## 2020-07-06 RX ADMIN — Medication 10 ML: at 22:16

## 2020-07-06 RX ADMIN — Medication 10 ML: at 14:00

## 2020-07-06 NOTE — PROGRESS NOTES
Bedside and Verbal shift change report given to Terry Chua RN (oncoming nurse) by Kemar Hua RN (offgoing nurse). Report included the following information SBAR, Kardex, Intake/Output, MAR and Recent Results. 1000: Patient informed nurse that their shoes, described as \"Brown with an \"S\" on it,\" were left in the ER upon transfer. Nurse called ER and spoke to Omar Santamaria. Omar Santamaria stated that there were no shoes left in the ER with that description. 1120: Floor nurse that had the patient previously notified primary nurse that there were shoes on the unit. Staff was unsure about who's shoes they were. Patient given shoes after confirming size and stating \"that's it! \"

## 2020-07-06 NOTE — PROGRESS NOTES
FERNANDO:  1. Patient on 2L [place] O2.  2. Home when stable with . Care Management Interventions  PCP Verified by CM: Yes  Palliative Care Criteria Met (RRAT>21 & CHF Dx)?: No  Mode of Transport at Discharge: Other (see comment)  MyChart Signup: No  Discharge Durable Medical Equipment: No  Health Maintenance Reviewed: Yes  Physical Therapy Consult: No  Occupational Therapy Consult: No  Speech Therapy Consult: No  Current Support Network: Lives with Spouse  Confirm Follow Up Transport: Family  The Plan for Transition of Care is Related to the Following Treatment Goals : Home with family  The Patient and/or Patient Representative was Provided with a Choice of Provider and Agrees with the Discharge Plan?: Yes  [place] Resource Information Provided?: No  Discharge Location  Discharge Placement: Home with family assistance    Reason for Admission:   COVID positive                   RUR Score:          8%           Plan for utilizing home health:      No indication at this time. PCP: Patient does not have a PCP, but interested in [facility]. CM will provide her with [facility] application, and she will need to follow-up with her local health dept. Current Advanced Directive/Advance Care Plan:  Not on file/ACP discussion completed. Transition of Care Plan:                    CM contact patient via telephone due to her being on contact precautions. CM explained role and offer support. She is alert and oriented x4, and confirmed demographics. CM updated her address;phone number; and emergency contact. Her  is also in the hospital, and she was recently around her mother who had COVID-19. She is independent with ADLs and IADLs prior to admission. Her preferred pharmacy is [facility] on [address] or [address] [place] [address] Pharmacy. She lives with her  and 2 children in their private residence. She does not work, but her  provides income.  CM to provide her with Care card application and [facility] information. There are currently no other discharge needs at this time.     Zoran Rodriguez [facility]

## 2020-07-06 NOTE — PROGRESS NOTES
Provided remote interpreting to pt and CM, Martina Lowry.     Alex Morales  [employer]    [phone]

## 2020-07-06 NOTE — PROGRESS NOTES
Problem: Breathing Pattern - Ineffective  Goal: Ability to achieve and maintain a regular respiratory rate  Outcome: Progressing Towards Goal     Problem: Nutrition Deficits  Goal: Optimize nutrtional status  Outcome: Progressing Towards Goal     Problem: Risk for Fluid Volume Deficit  Goal: Maintain normal heart rhythm  Outcome: Progressing Towards Goal     Problem: Fatigue  Goal: Verbalize increase energy and improved vitality  Outcome: Progressing Towards Goal     Problem: Pain - Acute  Goal: *Ability to perform ADLs and demonstrates progressive mobility and function  Outcome: Progressing Towards Goal

## 2020-07-06 NOTE — PROGRESS NOTES
[facility] [facility] Adult  Hospitalist Group                                                                                          Hospitalist Progress Note  Nicky Stephen MD  Answering service: [phone] from in house phone        Date of Service:  2020  NAME:  Fabby Carvalho  :  1980  MRN:  [id number]      Admission Summary:     Fabby Carvalho is a 38 y.o. female with no significant past medical history presented emergency room complaining of shortness of breath, increased fatigue chills and chest heaviness for the last 24 hours. Patient denies any fever, nausea, vomiting or abdominal pain. 8 days. Patient also reports having non-productive cough that has worsened. Her mother was recently diagnosed with COVID-19 infection. Patient came to the emergency room today for evaluation and she was found to be hypoxic with O2 sats of 91% on room air and dropped to the 80s with ambulation. She had a rapid coffee test in the ED that was positive. On chest x-ray she was found to have some opacities suggestive of pneumonia. Also  has been having similar symptoms. He is currently in the ED to be evaluated. Interval history / Subjective:     She said she feels better but she has exertional dyspnea and dry cough, no chest pain     Assessment & Plan:     Acute hypoxic respiratory failure due to pneumonia, COVID-19 infection  -COVID-19 test positive  -continue ceftriaxone, azithromycin, vitamin c and zinc, monitor pulse ox  -on droplet and contact isolation  -SpO2 96-99% on 2 l/m  -chest x ray diffuse interstitial infiltrates. Consider atypical viral processes.   -afebrile, no leukocytosis   -troponin and ferritin normal  -d dimer 0.69, fibrinogen 502, CRP 11.7,   -continue monitoring pulse ox            Code status: Full Code   DVT prophylaxis: Lovenox    Care Plan discussed with: Patient/Family and Nurse  Anticipated Disposition: Home w/Family  Anticipated Discharge: 24 hours to 48 hours     Hospital Problems  Never Reviewed          Codes Class Noted POA    Hypoxia ICD-10-CM: R09.02  ICD-9-CM: 799.02  7/4/2020 Unknown        COVID-19 virus infection ICD-10-CM: U07.1  ICD-9-CM: 079.89  7/4/2020 Unknown              Vital Signs:    Last 24hrs VS reviewed since prior progress note. Most recent are:  Visit Vitals  /65 (BP 1 Location: Left arm, BP Patient Position: At rest)   Pulse (!) 54   Temp 98.9 °F (37.2 °C)   Resp 18   Wt 67.7 kg (149 lb 4.8 oz)   SpO2 97%         Intake/Output Summary (Last 24 hours) at 7/6/2020 1142  Last data filed at 7/5/2020 1353  Gross per 24 hour   Intake 120 ml   Output    Net 120 ml        Physical Examination:             Constitutional:  No acute distress, cooperative, pleasant    ENT:  Oral mucosa moist, oropharynx benign. Resp:  Decrease bronchial breath sound bilaterally. No wheezing/rhonchi/rales. No accessory muscle use   CV:  Regular rhythm, normal rate, no murmurs, gallops, rubs    GI:  Soft, non distended, non tender. normoactive bowel sounds, no hepatosplenomegaly     Musculoskeletal:  No edema     Neurologic:  Moves all extremities.   AAOx3, CN II-XII reviewed     Skin:  Good turgor, no rashes or ulcers       Data Review:    Review and/or order of clinical lab test  Review and/or order of tests in the radiology section of CPT  Review and/or order of tests in the medicine section of CPT      Labs:     Recent Labs     07/06/20 0209 07/05/20 0413   WBC 3.4* 7.0   HGB 12.1 12.4   HCT 38.7 38.8    279     Recent Labs     07/06/20 0209 07/05/20 0413 07/04/20 2237    138 138   K 3.7 3.6 3.5   * 107 105   CO2 24 23 27   BUN 8 6 8   CREA 0.55 0.55 0.73   GLU 87 100 117*   CA 8.3* 8.3* 8.3*   MG  --   --  2.2     Recent Labs     07/06/20  0209 07/05/20  0413 07/04/20  2237   ALT 82* 86* 105*   * 210* 244*   TBILI 0.3 0.4 0.4   TP 6.9 7.1 8.0   ALB 3.0* 3.0* 3.6   GLOB 3.9 4.1* 4.4*     Recent Labs     07/06/20 0209 07/05/20 0413 07/04/20 2237   INR 1.0 1.0 1.0   PTP 10.3 10.5 10.3   APTT 25.7 31.3 30.2      Recent Labs     07/06/20 0209 07/05/20 0413   FERR 235 231      No results found for: FOL, RBCF   No results for input(s): PH, PCO2, PO2 in the last 72 hours.   Recent Labs     07/05/20 0413 07/04/20 2237   CPK 61  --    TROIQ  --  <0.05     No results found for: CHOL, CHOLX, CHLST, CHOLV, HDL, HDLP, LDL, LDLC, DLDLP, TGLX, TRIGL, TRIGP, CHHD, CHHDX  No results found for: GLUCPOC  No results found for: COLOR, APPRN, SPGRU, REFSG, ALAN, PROTU, GLUCU, KETU, BILU, UROU, AMARA, LEUKU, GLUKE, EPSU, BACTU, WBCU, RBCU, CASTS, UCRY      Medications Reviewed:     Current Facility-Administered Medications   Medication Dose Route Frequency    azithromycin (ZITHROMAX) 500 mg in 0.9% sodium chloride (MBP/ADV) 250 mL  500 mg IntraVENous Q24H    ascorbic acid (vitamin C) (VITAMIN C) tablet 500 mg  500 mg Oral BID    zinc sulfate (ZINCATE) 220 (50) mg capsule 1 Cap  1 Cap Oral DAILY    enoxaparin (LOVENOX) injection 30 mg  30 mg SubCUTAneous Q12H    sodium chloride (NS) flush 5-40 mL  5-40 mL IntraVENous Q8H    sodium chloride (NS) flush 5-40 mL  5-40 mL IntraVENous PRN    acetaminophen (TYLENOL) tablet 650 mg  650 mg Oral Q6H PRN    Or    acetaminophen (TYLENOL) suppository 650 mg  650 mg Rectal Q6H PRN     ______________________________________________________________________  EXPECTED LENGTH OF STAY: 5d 12h  ACTUAL LENGTH OF STAY:          2                 Zoran Nolen MD

## 2020-07-06 NOTE — PROGRESS NOTES
Bedside shift change report given to Leila Leon (oncoming nurse) by Rosalia Lawrence (offgoing nurse). Report included the following information SBAR, Kardex, MAR and Recent Results.

## 2020-07-06 NOTE — ACP (ADVANCE CARE PLANNING)
Advance Care Planning     Advance Care Planning Activator (Inpatient)  Conversation Note      Date of ACP Conversation: 07/06/20     Conversation Conducted with:   Patient with Decision Making Capacity    ACP Activator: [address] Decision Maker:    Current Designated Health Care Decision Maker:   Primary Decision Maker: Braden 06 - [phone]      Care Preferences    Ventilation: \"If you were in your present state of health and suddenly became very ill and were unable to breathe on your own, what would your preference be about the use of a ventilator (breathing machine) if it were available to you? \"      If patient would desire the use of a ventilator (breathing machine), answer \"yes\", if not \"no\":yes    \"If your health worsens and it becomes clear that your chance of recovery is unlikely, what would your preference be about the use of a ventilator (breathing machine) if it were available to you? \"     Would the patient desire the use of a ventilator (breathing machine)? Patient unsure, and requests to defer to MD>    Resuscitation  \"CPR works best to restart the heart when there is a sudden event, like a heart attack, in someone who is otherwise healthy. Unfortunately, CPR does not typically restart the heart for people who have serious health conditions or who are very sick. \"    \"In the event your heart stopped as a result of an underlying serious health condition, would you want attempts to be made to restart your heart (answer \"yes\" for attempt to resuscitate) or would you prefer a natural death (answer \"no\" for do not attempt to resuscitate)? \" yes      . [] Yes  [] No   Educated Patient / Dmitri Bores regarding differences between Advance Directives and portable DNR orders.     Length of ACP Conversation in minutes:      Conversation Outcomes:  [x] ACP discussion completed  [] Existing advance directive reviewed with patient; no changes to patient's previously recorded wishes     [] New Advance Directive completed   [] Portable Do Not Resuscitate prepared for Provider review and signature  [] POLST/POST/MOLST/MOST prepared for Provider review and signature      Follow-up plan:    [x] Schedule follow-up conversation to continue planning  [] Referred individual to Provider for additional questions/concerns   [] Advised patient/agent/surrogate to review completed ACP document and update if needed with changes in condition, patient preferences or care setting     [] This note routed to one or more involved healthcare providers       Jorge Wakefield [facility]

## 2020-07-07 VITALS
SYSTOLIC BLOOD PRESSURE: 110 MMHG | TEMPERATURE: 98.8 F | DIASTOLIC BLOOD PRESSURE: 64 MMHG | HEIGHT: 61 IN | WEIGHT: 149.3 LBS | OXYGEN SATURATION: 98 % | HEART RATE: 65 BPM | RESPIRATION RATE: 18 BRPM | BODY MASS INDEX: 28.19 KG/M2

## 2020-07-07 LAB
ALBUMIN SERPL-MCNC: 3.3 G/DL (ref 3.5–5)
ALBUMIN/GLOB SERPL: 0.8 {RATIO} (ref 1.1–2.2)
ALP SERPL-CCNC: 203 U/L (ref 45–117)
ALT SERPL-CCNC: 111 U/L (ref 12–78)
ANION GAP SERPL CALC-SCNC: 8 MMOL/L (ref 5–15)
APTT PPP: 25.6 SEC (ref 22.1–32)
AST SERPL-CCNC: 77 U/L (ref 15–37)
BASOPHILS # BLD: 0 K/UL (ref 0–0.1)
BASOPHILS NFR BLD: 0 % (ref 0–1)
BILIRUB SERPL-MCNC: 0.3 MG/DL (ref 0.2–1)
BUN SERPL-MCNC: 10 MG/DL (ref 6–20)
BUN/CREAT SERPL: 17 (ref 12–20)
CALCIUM SERPL-MCNC: 8.4 MG/DL (ref 8.5–10.1)
CHLORIDE SERPL-SCNC: 108 MMOL/L (ref 97–108)
CO2 SERPL-SCNC: 23 MMOL/L (ref 21–32)
CREAT SERPL-MCNC: 0.58 MG/DL (ref 0.55–1.02)
D DIMER PPP FEU-MCNC: 0.91 MG/L FEU (ref 0–0.65)
DIFFERENTIAL METHOD BLD: ABNORMAL
EOSINOPHIL # BLD: 0.1 K/UL (ref 0–0.4)
EOSINOPHIL NFR BLD: 1 % (ref 0–7)
ERYTHROCYTE [DISTWIDTH] IN BLOOD BY AUTOMATED COUNT: 12.4 % (ref 11.5–14.5)
FERRITIN SERPL-MCNC: 248 NG/ML (ref 8–252)
FIBRINOGEN PPP-MCNC: 459 MG/DL (ref 200–475)
GLOBULIN SER CALC-MCNC: 4 G/DL (ref 2–4)
GLUCOSE SERPL-MCNC: 92 MG/DL (ref 65–100)
HCT VFR BLD AUTO: 39.9 % (ref 35–47)
HGB BLD-MCNC: 12.7 G/DL (ref 11.5–16)
IMM GRANULOCYTES # BLD AUTO: 0 K/UL
IMM GRANULOCYTES NFR BLD AUTO: 0 %
INR PPP: 1 (ref 0.9–1.1)
LYMPHOCYTES # BLD: 1.9 K/UL (ref 0.8–3.5)
LYMPHOCYTES NFR BLD: 37 % (ref 12–49)
MCH RBC QN AUTO: 28.8 PG (ref 26–34)
MCHC RBC AUTO-ENTMCNC: 31.8 G/DL (ref 30–36.5)
MCV RBC AUTO: 90.5 FL (ref 80–99)
METAMYELOCYTES NFR BLD MANUAL: 1 %
MONOCYTES # BLD: 0.6 K/UL (ref 0–1)
MONOCYTES NFR BLD: 12 % (ref 5–13)
MYELOCYTES NFR BLD MANUAL: 1 %
NEUTS BAND NFR BLD MANUAL: 2 % (ref 0–6)
NEUTS SEG # BLD: 2.4 K/UL (ref 1.8–8)
NEUTS SEG NFR BLD: 46 % (ref 32–75)
NRBC # BLD: 0 K/UL (ref 0–0.01)
NRBC BLD-RTO: 0 PER 100 WBC
PLATELET # BLD AUTO: 392 K/UL (ref 150–400)
PLATELET COMMENTS,PCOM: ABNORMAL
PMV BLD AUTO: 8.6 FL (ref 8.9–12.9)
POTASSIUM SERPL-SCNC: 3.8 MMOL/L (ref 3.5–5.1)
PROT SERPL-MCNC: 7.3 G/DL (ref 6.4–8.2)
PROTHROMBIN TIME: 10.3 SEC (ref 9–11.1)
RBC # BLD AUTO: 4.41 M/UL (ref 3.8–5.2)
RBC MORPH BLD: ABNORMAL
SODIUM SERPL-SCNC: 139 MMOL/L (ref 136–145)
THERAPEUTIC RANGE,PTTT: NORMAL SECS (ref 58–77)
WBC # BLD AUTO: 5.1 K/UL (ref 3.6–11)

## 2020-07-07 PROCEDURE — 77010033678 HC OXYGEN DAILY

## 2020-07-07 PROCEDURE — 94760 N-INVAS EAR/PLS OXIMETRY 1: CPT

## 2020-07-07 PROCEDURE — 74011250637 HC RX REV CODE- 250/637: Performed by: INTERNAL MEDICINE

## 2020-07-07 PROCEDURE — 74011250636 HC RX REV CODE- 250/636: Performed by: INTERNAL MEDICINE

## 2020-07-07 PROCEDURE — 36415 COLL VENOUS BLD VENIPUNCTURE: CPT

## 2020-07-07 PROCEDURE — 74011250636 HC RX REV CODE- 250/636: Performed by: HOSPITALIST

## 2020-07-07 PROCEDURE — 82728 ASSAY OF FERRITIN: CPT

## 2020-07-07 PROCEDURE — 85025 COMPLETE CBC W/AUTO DIFF WBC: CPT

## 2020-07-07 PROCEDURE — 85384 FIBRINOGEN ACTIVITY: CPT

## 2020-07-07 PROCEDURE — 80053 COMPREHEN METABOLIC PANEL: CPT

## 2020-07-07 PROCEDURE — 85379 FIBRIN DEGRADATION QUANT: CPT

## 2020-07-07 PROCEDURE — 85730 THROMBOPLASTIN TIME PARTIAL: CPT

## 2020-07-07 PROCEDURE — 85610 PROTHROMBIN TIME: CPT

## 2020-07-07 RX ORDER — AZITHROMYCIN 250 MG/1
500 TABLET, FILM COATED ORAL DAILY
Qty: 6 TAB | Refills: 0 | Status: SHIPPED | OUTPATIENT
Start: 2020-07-07 | End: 2020-08-06

## 2020-07-07 RX ORDER — ASCORBIC ACID 500 MG
500 TABLET ORAL 2 TIMES DAILY
Qty: 60 TAB | Refills: 0 | Status: SHIPPED | OUTPATIENT
Start: 2020-07-07

## 2020-07-07 RX ORDER — ZINC SULFATE 50(220)MG
220 CAPSULE ORAL DAILY
Qty: 20 CAP | Refills: 0 | Status: SHIPPED | OUTPATIENT
Start: 2020-07-08 | End: 2021-11-23 | Stop reason: SDUPTHER

## 2020-07-07 RX ORDER — ACETAMINOPHEN 325 MG/1
650 TABLET ORAL
Qty: 40 TAB | Refills: 0 | Status: SHIPPED | OUTPATIENT
Start: 2020-07-07

## 2020-07-07 RX ADMIN — Medication 10 ML: at 06:35

## 2020-07-07 RX ADMIN — ENOXAPARIN SODIUM 30 MG: 30 INJECTION SUBCUTANEOUS at 06:35

## 2020-07-07 RX ADMIN — ZINC SULFATE 220 MG (50 MG) CAPSULE 1 CAPSULE: CAPSULE at 08:30

## 2020-07-07 RX ADMIN — AZITHROMYCIN MONOHYDRATE 500 MG: 500 INJECTION, POWDER, LYOPHILIZED, FOR SOLUTION INTRAVENOUS at 00:42

## 2020-07-07 RX ADMIN — Medication 500 MG: at 08:30

## 2020-07-07 NOTE — PROGRESS NOTES
Reviewed pt discharge instructions and medications/prescriptions via blue [employer] phone. Answered any and all questions and concerns. Pt leaving via private transportation.

## 2020-07-07 NOTE — PROGRESS NOTES
Problem: Pain - Acute  Goal: *Ability to perform ADLs and demonstrates progressive mobility and function  Outcome: Resolved/Met     Problem: Airway Clearance - Ineffective  Goal: Achieve or maintain patent airway  7/7/2020 1357 by Mickey Carey RN  Outcome: Resolved/Met  7/7/2020 1057 by Mickey Carey RN  Outcome: Progressing Towards Goal     Problem: Gas Exchange - Impaired  Goal: Absence of hypoxia  7/7/2020 1357 by Mickey Carey RN  Outcome: Resolved/Met  7/7/2020 1057 by Mickey Carey RN  Outcome: Progressing Towards Goal  Goal: Promote optimal lung function  Outcome: Resolved/Met     Problem: Breathing Pattern - Ineffective  Goal: Ability to achieve and maintain a regular respiratory rate  7/7/2020 1357 by Mickey Carey RN  Outcome: Resolved/Met  7/7/2020 1057 by Mickey Carey RN  Outcome: Progressing Towards Goal     Problem:  Body Temperature -  Risk of, Imbalanced  Goal: Ability to maintain a body temperature within defined limits  Outcome: Resolved/Met  Goal: Will regain or maintain usual level of consciousness  Outcome: Resolved/Met  Goal: Complications related to the disease process, condition or treatment will be avoided or minimized  Outcome: Resolved/Met     Problem: Isolation Precautions - Risk of Spread of Infection  Goal: Prevent transmission of infectious organism to others  Outcome: Resolved/Met     Problem: Nutrition Deficits  Goal: Optimize nutrtional status  Outcome: Resolved/Met     Problem: Risk for Fluid Volume Deficit  Goal: Maintain normal heart rhythm  Outcome: Resolved/Met  Goal: Maintain absence of muscle cramping  Outcome: Resolved/Met  Goal: Maintain normal serum potassium, sodium, calcium, phosphorus, and pH  Outcome: Resolved/Met     Problem: Loneliness or Risk for Loneliness  Goal: Demonstrate positive use of time alone when socialization is not possible  Outcome: Resolved/Met     Problem: Fatigue  Goal: Verbalize increase energy and improved vitality  Outcome: Resolved/Met     Problem: Patient Education: Go to Patient Education Activity  Goal: Patient/Family Education  Outcome: Resolved/Met     Problem: Discharge Planning  Goal: *Discharge to safe environment  Description: See cm note. Danna Gibbs [facility]     Outcome: Resolved/Met     Problem: Falls - Risk of  Goal: *Absence of Falls  Description: Document Devere McFall Fall Risk and appropriate interventions in the flowsheet.   Outcome: Resolved/Met  Note: Fall Risk Interventions:                                Problem: Patient Education: Go to Patient Education Activity  Goal: Patient/Family Education  Outcome: Resolved/Met

## 2020-07-07 NOTE — PROGRESS NOTES
Reviewed chart and discussed in rounds. Patient will discharge home today, and she has been given a copy of the care card application and [facility] information. She will discharge home today with family assistance.       Danna Gibbs [facility]

## 2020-07-07 NOTE — PROGRESS NOTES
[facility] [facility] Adult  Hospitalist Group                                                                                          Hospitalist Progress Note  Dillon Underwood MD  Answering service: [phone] from in house phone        Date of Service:  2020  NAME:  Lorena Triplett  :  1980  MRN:  [id number]      Admission Summary:     Lorena Triplett is a 38 y.o. female with no significant past medical history presented emergency room complaining of shortness of breath, increased fatigue chills and chest heaviness for the last 24 hours. Patient denies any fever, nausea, vomiting or abdominal pain. 8 days. Patient also reports having non-productive cough that has worsened. Her mother was recently diagnosed with COVID-19 infection. Patient came to the emergency room today for evaluation and she was found to be hypoxic with O2 sats of 91% on room air and dropped to the 80s with ambulation. She had a rapid coffee test in the ED that was positive. On chest x-ray she was found to have some opacities suggestive of pneumonia. Also  has been having similar symptoms. He is currently in the ED to be evaluated. Interval history / Subjective:     She said she feels better today and ready to go home      Assessment & Plan:     Acute hypoxic respiratory failure due to pneumonia, COVID-19 infection  -COVID-19 test positive  -on ceftriaxone, azithromycin, vitamin c and zinc, monitor pulse ox  -on droplet and contact isolation  -SpO2 95-99% on RA  -chest x ray diffuse interstitial infiltrates. Consider atypical viral processes. -afebrile, no leukocytosis   -troponin and ferritin normal  -d dimer 0.69, fibrinogen 502, CRP 11.7,   -continue monitoring pulse ox            Code status: Full Code   DVT prophylaxis: Lovenox    Care Plan discussed with: Patient/Family and Nurse  Anticipated Disposition: Home w/Family  Anticipated Discharge:  Today     Hospital Problems Never Reviewed          Codes Class Noted POA    Hypoxia ICD-10-CM: R09.02  ICD-9-CM: 799.02  7/4/2020 Unknown        COVID-19 virus infection ICD-10-CM: U07.1  ICD-9-CM: 079.89  7/4/2020 Unknown              Vital Signs:    Last 24hrs VS reviewed since prior progress note. Most recent are:  Visit Vitals  /64 (BP 1 Location: Right arm, BP Patient Position: At rest)   Pulse 65   Temp 98.8 °F (37.1 °C)   Resp 18   Ht 5' 1\" (1.549 m)   Wt 67.7 kg (149 lb 4.8 oz)   SpO2 98%   BMI 28.21 kg/m²       No intake or output data in the 24 hours ending 07/07/20 1112     Physical Examination:             Constitutional:  No acute distress, cooperative, pleasant    ENT:  Oral mucosa moist, oropharynx benign. Resp:  Decrease bronchial breath sound bilaterally. No wheezing/rhonchi/rales. No accessory muscle use   CV:  Regular rhythm, normal rate, no murmurs, gallops, rubs    GI:  Soft, non distended, non tender. normoactive bowel sounds, no hepatosplenomegaly     Musculoskeletal:  No edema     Neurologic:  Moves all extremities.   AAOx3, CN II-XII reviewed     Skin:  Good turgor, no rashes or ulcers       Data Review:    Review and/or order of clinical lab test  Review and/or order of tests in the radiology section of CPT  Review and/or order of tests in the medicine section of CPT      Labs:     Recent Labs     07/07/20 0041 07/06/20 0209   WBC 5.1 3.4*   HGB 12.7 12.1   HCT 39.9 38.7    325     Recent Labs     07/07/20  0041 07/06/20  0209 07/05/20  0413 07/04/20  2237    141 138 138   K 3.8 3.7 3.6 3.5    109* 107 105   CO2 23 24 23 27   BUN 10 8 6 8   CREA 0.58 0.55 0.55 0.73   GLU 92 87 100 117*   CA 8.4* 8.3* 8.3* 8.3*   MG  --   --   --  2.2     Recent Labs     07/07/20  0041 07/06/20  0209 07/05/20  0413   * 82* 86*   * 200* 210*   TBILI 0.3 0.3 0.4   TP 7.3 6.9 7.1   ALB 3.3* 3.0* 3.0*   GLOB 4.0 3.9 4.1*     Recent Labs     07/07/20  0653 07/06/20  0209 07/05/20  0413   INR 1.0 1.0 1.0   PTP 10.3 10.3 10.5   APTT 25.6 25.7 31.3      Recent Labs     07/07/20  0041 07/06/20  0209   FERR 248 235      No results found for: FOL, RBCF   No results for input(s): PH, PCO2, PO2 in the last 72 hours.   Recent Labs     07/05/20  0413 07/04/20  2237   CPK 61  --    TROIQ  --  <0.05     No results found for: CHOL, CHOLX, CHLST, CHOLV, HDL, HDLP, LDL, LDLC, DLDLP, TGLX, TRIGL, TRIGP, CHHD, CHHDX  No results found for: GLUCPOC  No results found for: COLOR, APPRN, SPGRU, REFSG, ALAN, PROTU, GLUCU, KETU, BILU, UROU, AMARA, LEUKU, GLUKE, EPSU, BACTU, WBCU, RBCU, CASTS, UCRY      Medications Reviewed:     Current Facility-Administered Medications   Medication Dose Route Frequency    azithromycin (ZITHROMAX) 500 mg in 0.9% sodium chloride (MBP/ADV) 250 mL  500 mg IntraVENous Q24H    ascorbic acid (vitamin C) (VITAMIN C) tablet 500 mg  500 mg Oral BID    zinc sulfate (ZINCATE) 220 (50) mg capsule 1 Cap  1 Cap Oral DAILY    enoxaparin (LOVENOX) injection 30 mg  30 mg SubCUTAneous Q12H    sodium chloride (NS) flush 5-40 mL  5-40 mL IntraVENous Q8H    sodium chloride (NS) flush 5-40 mL  5-40 mL IntraVENous PRN    acetaminophen (TYLENOL) tablet 650 mg  650 mg Oral Q6H PRN    Or    acetaminophen (TYLENOL) suppository 650 mg  650 mg Rectal Q6H PRN     ______________________________________________________________________  EXPECTED LENGTH OF STAY: 5d 12h  ACTUAL LENGTH OF STAY:          3                 Katt Varma MD 21-Jul-2019 00:18

## 2020-07-07 NOTE — PROGRESS NOTES
Verbal shift change report given to Sohan Ellis RN (oncoming nurse) by Reyna Kanner (offgoing nurse). Report included the following information SBAR, Kardex and MAR.

## 2020-07-07 NOTE — DISCHARGE SUMMARY
Discharge Summary       PATIENT ID: Ethel Luh  MRN: [id number]   YOB: 1980    DATE OF ADMISSION: 7/4/2020  9:44 PM    DATE OF DISCHARGE: 7/7/2020   PRIMARY CARE PROVIDER: None     ATTENDING PHYSICIAN: Janee Montoya MD  DISCHARGING PROVIDER: Logan Freitas MD    To contact this individual call [phone] and ask the  to page. If unavailable ask to be transferred the Adult Hospitalist Department. CONSULTATIONS: None    PROCEDURES/SURGERIES: * No surgery found *    ADMITTING DIAGNOSES & HOSPITAL COURSE:     Olga Fowler a 38 y.o. female with no significant past medical history presented emergency room complaining of shortness of breath, increased fatigue chills and chest heaviness for the last 24 hours.  Patient denies any fever, nausea, vomiting or abdominal pain.  8 days.  Patient also reports having non-productive cough that has worsened.  Her mother was recently diagnosed with COVID-19 infection.  Patient came to the emergency room today for evaluation and she was found to be hypoxic with O2 sats of 91% on room air and dropped to the 80s with ambulation.  She had a rapid coffee test in the ED that was positive.  On chest x-ray she was found to have some opacities suggestive of pneumonia.  Also  has been having similar symptoms. Latricia Brown is currently in the ED to be evaluated.     Acute hypoxic respiratory failure due to pneumonia, COVID-19 infection  -COVID-19 test positive  -discontinue IV ceftriaxone, azithromycin, vitamin c and zinc, monitor pulse ox  -on droplet and contact isolation  -hypoxia resolved, SpO2 95-99% on RA  -chest x ray diffuse interstitial infiltrates. Consider atypical viral processes.   -afebrile, no leukocytosis   -troponin and ferritin normal  -d dimer 0.69, fibrinogen 502, CRP 11.7,   -continue monitoring pulse ox  -feels better and asymptomatic     Code status: Full Code   DVT prophylaxis: Lovenox    DISCHARGE DIAGNOSES / PLAN:      Acute hypoxic respiratory failure due to pneumonia, COVID-19 infection  -COVID-19 test positive  -ceftriaxone, azithromycin, continue vitamin c and zinc   -hypoxia resolved, SpO2 95-99% on RA  -feels better and asymptomatic    PENDING TEST RESULTS:   At the time of discharge the following test results are still pending: None    FOLLOW UP APPOINTMENTS:    Follow-up Information     Follow up With Specialties Details Why Contact Info    Primary Care Physician In one week In one week  None ([phone]) Patient stated that they have no PCP           DIET: Regular Diet    ACTIVITY: Activity as tolerated    WOUND CARE: None    EQUIPMENT needed: None    DISCHARGE MEDICATIONS:  Current Discharge Medication List      START taking these medications    Details   acetaminophen (TYLENOL) 325 mg tablet Take 2 Tabs by mouth every six (6) hours as needed for Pain or Fever. Qty: 40 Tab, Refills: 0      ascorbic acid, vitamin C, (VITAMIN C) 500 mg tablet Take 1 Tab by mouth two (2) times a day. Qty: 60 Tab, Refills: 0      zinc sulfate (ZINCATE) 220 (50) mg capsule Take 1 Cap by mouth daily. Qty: 20 Cap, Refills: 0      azithromycin (ZITHROMAX) 250 mg tablet Take 2 Tabs by mouth daily. Qty: 6 Tab, Refills: 0    Associated Diagnoses: Hypoxia; COVID-19 virus infection               NOTIFY YOUR PHYSICIAN FOR ANY OF THE FOLLOWING:   Fever over 101 degrees for 24 hours. Chest pain, shortness of breath, fever, chills, nausea, vomiting, diarrhea, change in mentation, falling, weakness, bleeding. Severe pain or pain not relieved by medications. Or, any other signs or symptoms that you may have questions about.     DISPOSITION:    Home With:   OT  PT  HH  RN       Long term SNF/Inpatient Rehab   x Independent/assisted living    Hospice    Other:       PATIENT CONDITION AT DISCHARGE:     Functional status    Poor     Deconditioned    x Independent      Cognition   x  Lucid     Forgetful     Dementia      Catheters/lines (plus indication)    Issa     PICC     PEG    x None      Code status    x Full code     DNR      PHYSICAL EXAMINATION AT DISCHARGE:  Patient Vitals for the past 24 hrs:   Temp Pulse Resp BP SpO2   07/07/20 0820 98.8 °F (37.1 °C) 65 18 110/64 98 %   07/07/20 0450 99.3 °F (37.4 °C) (!) 51 16 102/57 95 %   07/07/20 0123     96 %   07/06/20 2054 98.9 °F (37.2 °C) (!) 53 16 114/72 96 %     General:          Alert, cooperative, no distress, appears stated age. HEENT:           Atraumatic, anicteric sclerae, pink conjunctivae                          No oral ulcers, mucosa moist, throat clear, dentition fair  Neck:               Supple, symmetrical  Lungs:             Clear to auscultation bilaterally. No Wheezing or Rhonchi. No rales. Chest wall:      No tenderness  No Accessory muscle use. Heart:              Regular  rhythm,  No  murmur   No edema  Abdomen:        Soft, non-tender. Not distended. Bowel sounds normal  Extremities:     No cyanosis. No clubbing,                            Skin turgor normal, Capillary refill normal  Skin:                Not pale. Not Jaundiced  No rashes   Psych:             Not anxious or agitated.   Neurologic:      Alert, moves all extremities, answers questions appropriately and responds to commands       Recent Results (from the past 24 hour(s))   CBC WITH AUTOMATED DIFF    Collection Time: 07/07/20 12:41 AM   Result Value Ref Range    WBC 5.1 3.6 - 11.0 K/uL    RBC 4.41 3.80 - 5.20 M/uL    HGB 12.7 11.5 - 16.0 g/dL    HCT 39.9 35.0 - 47.0 %    MCV 90.5 80.0 - 99.0 FL    MCH 28.8 26.0 - 34.0 PG    MCHC 31.8 30.0 - 36.5 g/dL    RDW 12.4 11.5 - 14.5 %    PLATELET 673 839 - 911 K/uL    MPV 8.6 (L) 8.9 - 12.9 FL    NRBC 0.0 0  WBC    ABSOLUTE NRBC 0.00 0.00 - 0.01 K/uL    NEUTROPHILS 46 32 - 75 %    BAND NEUTROPHILS 2 0 - 6 %    LYMPHOCYTES 37 12 - 49 %    MONOCYTES 12 5 - 13 %    EOSINOPHILS 1 0 - 7 %    BASOPHILS 0 0 - 1 %    METAMYELOCYTES 1 (H) 0 %    MYELOCYTES 1 (H) 0 %    IMMATURE GRANULOCYTES 0 %    ABS. NEUTROPHILS 2.4 1.8 - 8.0 K/UL    ABS. LYMPHOCYTES 1.9 0.8 - 3.5 K/UL    ABS. MONOCYTES 0.6 0.0 - 1.0 K/UL    ABS. EOSINOPHILS 0.1 0.0 - 0.4 K/UL    ABS. BASOPHILS 0.0 0.0 - 0.1 K/UL    ABS. IMM. GRANS. 0.0 K/UL    DF MANUAL      PLATELET COMMENTS Large Platelets      RBC COMMENTS ATYPICAL LYMPHOCYTES PRESENT     METABOLIC PANEL, COMPREHENSIVE    Collection Time: 07/07/20 12:41 AM   Result Value Ref Range    Sodium 139 136 - 145 mmol/L    Potassium 3.8 3.5 - 5.1 mmol/L    Chloride 108 97 - 108 mmol/L    CO2 23 21 - 32 mmol/L    Anion gap 8 5 - 15 mmol/L    Glucose 92 65 - 100 mg/dL    BUN 10 6 - 20 MG/DL    Creatinine 0.58 0.55 - 1.02 MG/DL    BUN/Creatinine ratio 17 12 - 20      GFR est AA >60 >60 ml/min/1.73m2    GFR est non-AA >60 >60 ml/min/1.73m2    Calcium 8.4 (L) 8.5 - 10.1 MG/DL    Bilirubin, total 0.3 0.2 - 1.0 MG/DL    ALT (SGPT) 111 (H) 12 - 78 U/L    AST (SGOT) 77 (H) 15 - 37 U/L    Alk.  phosphatase 203 (H) 45 - 117 U/L    Protein, total 7.3 6.4 - 8.2 g/dL    Albumin 3.3 (L) 3.5 - 5.0 g/dL    Globulin 4.0 2.0 - 4.0 g/dL    A-G Ratio 0.8 (L) 1.1 - 2.2     FERRITIN    Collection Time: 07/07/20 12:41 AM   Result Value Ref Range    Ferritin 248 8 - 252 NG/ML   PTT    Collection Time: 07/07/20  6:53 AM   Result Value Ref Range    aPTT 25.6 22.1 - 32.0 sec    aPTT, therapeutic range     58.0 - 77.0 SECS   PROTHROMBIN TIME + INR    Collection Time: 07/07/20  6:53 AM   Result Value Ref Range    INR 1.0 0.9 - 1.1      Prothrombin time 10.3 9.0 - 11.1 sec   D DIMER    Collection Time: 07/07/20  6:53 AM   Result Value Ref Range    D-dimer 0.91 (H) 0.00 - 0.65 mg/L FEU   FIBRINOGEN    Collection Time: 07/07/20  6:53 AM   Result Value Ref Range    Fibrinogen 459 200 - 475 mg/dL     CHRONIC MEDICAL DIAGNOSES:  Problem List as of 7/7/2020 Never Reviewed          Codes Class Noted - Resolved    Hypoxia ICD-10-CM: R09.02  ICD-9-CM: 799.02  7/4/2020 - Present        COVID-19 virus infection ICD-10-CM: U07.1  ICD-9-CM: 079.89  7/4/2020 - Present              Greater than 45 minutes were spent with the patient on counseling and coordination of care    Signed:   Mikey Galicia MD  7/7/2020  11:17 AM

## 2020-07-07 NOTE — PROGRESS NOTES
Problem: Airway Clearance - Ineffective  Goal: Achieve or maintain patent airway  Outcome: Progressing Towards Goal     Problem: Gas Exchange - Impaired  Goal: Absence of hypoxia  Outcome: Progressing Towards Goal     Problem: Breathing Pattern - Ineffective  Goal: Ability to achieve and maintain a regular respiratory rate  Outcome: Progressing Towards Goal

## 2020-07-07 NOTE — PROGRESS NOTES
Bedside shift change report given to Lex Garcia [address] (oncoming nurse) by Denae Fontenot (offgoing nurse). Report included the following information SBAR, Kardex, Intake/Output and MAR.

## 2020-07-07 NOTE — PROGRESS NOTES
Problem: Falls - Risk of  Goal: *Absence of Falls  Description: Document Rebbecca Persons Fall Risk and appropriate interventions in the flowsheet.   Outcome: Progressing Towards Goal  Note: Fall Risk Interventions:  Encourage patient to rise slowly

## 2020-07-07 NOTE — DISCHARGE INSTRUCTIONS
Discharge Instructions       PATIENT ID: Thalia Melo  MRN: [id number]   YOB: 1980    DATE OF ADMISSION: 7/4/2020  9:44 PM    DATE OF DISCHARGE: 7/7/2020    PRIMARY CARE PROVIDER: None     ATTENDING PHYSICIAN: Dione Waddell MD  DISCHARGING PROVIDER: Tony Elena MD    To contact this individual call [phone] and ask the  to page. If unavailable ask to be transferred the Adult Hospitalist Department. DISCHARGE DIAGNOSES   Acute hypoxic respiratory failure due to pneumonia, COVID-19 infection    CONSULTATIONS: None    PROCEDURES/SURGERIES: * No surgery found *    PENDING TEST RESULTS:   At the time of discharge the following test results are still pending: None    FOLLOW UP APPOINTMENTS:   Primary Care Physician in one week    ADDITIONAL CARE RECOMMENDATIONS:     DIET: Regular Diet    ACTIVITY: Activity as tolerated    WOUND CARE: None    EQUIPMENT needed: None      DISCHARGE MEDICATIONS:   See Medication Reconciliation Form    · It is important that you take the medication exactly as they are prescribed. · Keep your medication in the bottles provided by the pharmacist and keep a list of the medication names, dosages, and times to be taken in your wallet. · Do not take other medications without consulting your doctor. NOTIFY YOUR PHYSICIAN FOR ANY OF THE FOLLOWING:   Fever over 101 degrees for 24 hours. Chest pain, shortness of breath, fever, chills, nausea, vomiting, diarrhea, change in mentation, falling, weakness, bleeding. Severe pain or pain not relieved by medications. Or, any other signs or symptoms that you may have questions about.       DISPOSITION:    Home With:   OT  PT  HH  RN       SNF/Inpatient Rehab/LTAC   x Independent/assisted living    Hospice    Other:     CDMP Checked:   Yes x     PROBLEM LIST Updated:  Yes x       Signed:   Tony Elena MD  7/7/2020  2:53 PM

## 2020-07-08 ENCOUNTER — TELEPHONE (OUTPATIENT)
Dept: CASE MANAGEMENT | Age: 40
End: 2020-07-08

## 2020-07-08 NOTE — TELEPHONE ENCOUNTER
20 12:20 PM Mila  # [id number]    Patient contacted regarding COVID-19 diagnosis. Discussed COVID-19 related testing which was available at this time. Test results were positive. Patient informed of results, if available? yes     Care Transition Nurse/ Ambulatory Care Manager contacted the patient by telephone to perform post discharge assessment. Verified name and  with patient as identifiers. Provided introduction to self, and explanation of the CTN/ACM role, and reason for call due to risk factors for infection and/or exposure to COVID-19. Symptoms reviewed with patient who verbalized the following symptoms: fever, fatigue--thinks last evening her BP was low, pain or aching joints, shortness of breath, sweating, chest pain--one episode last evening, fast heart rate, fast breathing, dizziness/lightheadedness, no new symptoms and no worsening symptoms. Due to no new or worsening symptoms encounter was not routed to provider for escalation. Discussed follow-up appointments. If no appointment was previously scheduled, appointment scheduling offered: no, but gave her the phone # for appts with Ramonita Silver. She is familiar with this mobile clinic and I recommended she call for an appt once she has been advised she can break quarantine. She was given the [facility] HD # to call for advice on this. [facility] follow up appointment(s): No future appointments. Non-[facility] follow up appointment(s): none      Patient has following risk factors of: pneumonia and acute respiratory failure. CTN/ACM reviewed discharge instructions, medical action plan and red flags such as increased shortness of breath, increasing fever and signs of decompensation with patient who verbalized understanding. Discussed exposure protocols and quarantine with CDC Guidelines What to do if you are sick with coronavirus disease 2019.  Patient was given an opportunity for questions and concerns.  The patient agrees to contact the [facility] exposure line [phone], local [facility] department [address]  ([phone]) and PCP office for questions related to their healthcare. CTN/ACM provided contact information for future needs. Reviewed and educated patient on any new and changed medications related to discharge diagnosis. She states there was one of the four medications her pharmacy did not have in stock but was told it will be in today and she will send someone to pick it up. She reports there are 9 individuals in her household and everyone but her and her  is well. They are all trying to keep their distance and are maintaining quarantine. Her  is currently hospitalized at [facility]. Encouraged her to seek advice from [facility] regarding all of her household and to find out what criteria need to be passed to break quarantine and when. She agrees to call. She also was given the COVID-19 resource phone #s above for additional questions/concerns. Offered to send the [employer] COVID-19 information directly from their website to her in Peruvian via email but no one in the household but her  has email. Advised to watch over her household members for development of cough, fever, SOB and advised when to seek care in the ED for these individuals and when to call the HD instead. Patient/family/caregiver given information for [employer] and agrees to enroll no, Peruvian-speaking  Patient's preferred e-mail:  N/A  Patient's preferred phone number: N/A  Based on Loop alert triggers, patient will be contacted by nurse care manager for worsening symptoms. Plan for follow-up call in 5-7 days based on severity of symptoms and risk factors.

## 2020-07-17 ENCOUNTER — TELEPHONE (OUTPATIENT)
Dept: CASE MANAGEMENT | Age: 40
End: 2020-07-17

## 2020-07-17 NOTE — TELEPHONE ENCOUNTER
7/17/20 6:19 PM--attempted to reach patient via [address] [place] [address]  # [phone], but she is not answering the phone at this time. A final call will be made next Friday before concluding this episode of care.

## 2020-07-24 ENCOUNTER — TELEPHONE (OUTPATIENT)
Dept: CASE MANAGEMENT | Age: 40
End: 2020-07-24

## 2020-07-24 NOTE — TELEPHONE ENCOUNTER
7/24/20 4:35 PM--attempted to reach pt via [email or web address]  # [phone], but she is not answering at this time. Patient resolved from Transition of Care episode on 7/24/20. ACM/CTN was unsuccessful at contacting this patient today. Patient/family was provided the following resources and education related to COVID-19 during the initial call:                         Signs, symptoms and red flags related to COVID-19            CDC exposure and quarantine guidelines            [facility] exposure contact - [phone]            Contact for their local Department of Health                 Patient has not had any additional ED or hospital visits. No further outreach scheduled with this CTN/ACM. Episode of Care resolved. Patient has this CTN/ACM contact information if future needs arise.

## 2020-08-06 ENCOUNTER — OFFICE VISIT (OUTPATIENT)
Dept: FAMILY MEDICINE CLINIC | Age: 40
End: 2020-08-06

## 2020-08-06 VITALS — WEIGHT: 145 LBS | BODY MASS INDEX: 27.4 KG/M2

## 2020-08-06 DIAGNOSIS — U07.1 COVID-19 VIRUS INFECTION: Primary | ICD-10-CM

## 2020-08-06 DIAGNOSIS — R51.9 NONINTRACTABLE HEADACHE, UNSPECIFIED CHRONICITY PATTERN, UNSPECIFIED HEADACHE TYPE: ICD-10-CM

## 2020-08-06 PROCEDURE — 99213 OFFICE O/P EST LOW 20 MIN: CPT | Performed by: FAMILY MEDICINE

## 2020-08-06 RX ORDER — ASPIRIN 81 MG/1
TABLET ORAL
Qty: 90 TAB | Refills: 3 | Status: SHIPPED | OUTPATIENT
Start: 2020-08-06

## 2020-08-06 NOTE — PROGRESS NOTES
HISTORY OF PRESENT ILLNESS  Nilsa Li is a 38 y.o. female. HPI  I was at home while conducting this encounter. Consent:  She and/or her healthcare decision maker is aware that this patient-initiated Telehealth encounter is a billable service, with coverage as determined by her insurance carrier. She is aware that she may receive a bill and has provided verbal consent to proceed: Yes    This virtual visit was conducted telephone encounter only. -  I affirm this is a Patient Initiated Episode with an Established Patient who has not had a related appointment within my department in the past 7 days or scheduled within the next 24 hours. Note: this encounter is not billable if this call serves to triage the patient into an appointment for the relevant concern. Total Time: minutes: 11-20 minutes. Patient states she was admitted to the hospital due to coronavirus. 7/4/20  Was discharged 7/8/20. Patient states she feels better now. States everynight at about 2 am she wakes up with a headache. She is taking tylenol and multivitamin at this point    ROS    Physical Exam    ASSESSMENT and PLAN  Diagnoses and all orders for this visit:    1. COVID-19 virus infection    2. Nonintractable headache, unspecified chronicity pattern, unspecified headache type  -     aspirin delayed-release 81 mg tablet;  One tablet PO once a day x 90 days      We will write aspirin 81 mg once a day  Follow up in 4 weeks telemedicine

## 2021-07-23 ENCOUNTER — OFFICE VISIT (OUTPATIENT)
Dept: FAMILY MEDICINE CLINIC | Age: 41
End: 2021-07-23

## 2021-07-23 DIAGNOSIS — Z71.89 COUNSELING AND COORDINATION OF CARE: Primary | ICD-10-CM

## 2021-07-23 PROCEDURE — 99080 SPECIAL REPORTS OR FORMS: CPT | Performed by: PHYSICIAN ASSISTANT

## 2021-07-23 NOTE — PROGRESS NOTES
V.V. Patient called OW to ask assistance with bills. OW started financial screening for Care Card application. An appt was made to [address] on 7/28/21 at 11:15am. Patient replied NO to all covid19 screening questions. Pending SL, Bank statement and bill account number.

## 2021-07-28 ENCOUNTER — OFFICE VISIT (OUTPATIENT)
Dept: FAMILY MEDICINE CLINIC | Age: 41
End: 2021-07-28

## 2021-07-28 DIAGNOSIS — Z71.89 COUNSELING AND COORDINATION OF CARE: Primary | ICD-10-CM

## 2021-07-28 PROCEDURE — 99080 SPECIAL REPORTS OR FORMS: CPT | Performed by: PHYSICIAN ASSISTANT

## 2021-07-28 NOTE — PROGRESS NOTES
OW met with patient, partner and their child. Patient brought pending bill, bank statement and signed forms. SL was notarized. Care Card application was completed. OW provided stamped self addressed envelope to patient. She will mail it to [facility]. OW also provided written instructions in Upper sorbian about next steps.

## 2021-10-18 ENCOUNTER — TELEPHONE (OUTPATIENT)
Dept: FAMILY MEDICINE CLINIC | Age: 41
End: 2021-10-18

## 2021-10-18 NOTE — TELEPHONE ENCOUNTER
V.V. Patient called OW to ask about the status of the Card card application. OW is unable to see information on patient's chart. OW provided BS FA phone number for patient to call and find out.

## 2021-11-23 ENCOUNTER — OFFICE VISIT (OUTPATIENT)
Dept: FAMILY MEDICINE CLINIC | Age: 41
End: 2021-11-23

## 2021-11-23 VITALS
BODY MASS INDEX: 30.21 KG/M2 | SYSTOLIC BLOOD PRESSURE: 147 MMHG | TEMPERATURE: 97.3 F | HEIGHT: 61 IN | WEIGHT: 160 LBS | HEART RATE: 65 BPM | DIASTOLIC BLOOD PRESSURE: 78 MMHG | RESPIRATION RATE: 16 BRPM | OXYGEN SATURATION: 99 %

## 2021-11-23 DIAGNOSIS — I10 ESSENTIAL HYPERTENSION: Primary | ICD-10-CM

## 2021-11-23 DIAGNOSIS — Z30.431 IUD CHECK UP: ICD-10-CM

## 2021-11-23 DIAGNOSIS — Z76.0 MEDICATION REFILL: ICD-10-CM

## 2021-11-23 PROCEDURE — 99214 OFFICE O/P EST MOD 30 MIN: CPT | Performed by: FAMILY MEDICINE

## 2021-11-23 RX ORDER — ASPIRIN 81 MG/1
TABLET ORAL
Qty: 90 TABLET | Refills: 3 | Status: CANCELLED | OUTPATIENT
Start: 2021-11-23

## 2021-11-23 RX ORDER — ZINC SULFATE 50(220)MG
2 CAPSULE ORAL DAILY
Qty: 60 CAPSULE | Refills: 2 | Status: SHIPPED | OUTPATIENT
Start: 2021-11-23 | End: 2022-02-21

## 2021-11-23 RX ORDER — LISINOPRIL 5 MG/1
5 TABLET ORAL DAILY
Qty: 60 TABLET | Refills: 2 | Status: SHIPPED | OUTPATIENT
Start: 2021-11-23 | End: 2022-05-22

## 2021-11-23 NOTE — PROGRESS NOTES
Charles Sánchez is a 42 y.o. female  Chief Complaint   Patient presents with    Well Woman     IUD check    Medication Refill     Blood pressure (!) 150/96, pulse 65, temperature 97.3 °F (36.3 °C), temperature source Temporal, resp. rate 16, height 5' 1\" (1.549 m), weight 160 lb (72.6 kg), SpO2 99 %. 1. Have you been to the ER, urgent care clinic since your last visit? Hospitalized since your last visit? No    2. Have you seen or consulted any other health care providers outside of the [address] [place] [address] since your last visit? Include any pap smears or colon screening.  No

## 2021-11-23 NOTE — PROGRESS NOTES
Rosemary Enamorado is a 42 y.o. female   Chief Complaint   Patient presents with    Well Woman     IUD check    Medication Refill         ASSESSMENT AND PLAN:    1. Essential hypertension  Will start Lisinopril 5mg - r/b/i discussed with patient. Pt advised to discontinue ASA as it does not treat blood pressure and she is >4 months past her COVID dx.    - lisinopriL (PRINIVIL, ZESTRIL) 5 mg tablet; Take 1 Tablet by mouth daily for 180 days. Indications: high blood pressure  Dispense: 60 Tablet; Refill: 2    2. Medication refill  Per patient request. Okay to continue zinc.   - zinc sulfate (ZINCATE) 50 mg zinc (220 mg) capsule; Take 2 Capsules by mouth daily for 90 days. Dispense: 60 Capsule; Refill: 2    3. IUD check up  Due for removal. Do not have ring forceps today. Patient will return in 1 week for removal. Will perform pap at that time. Will also refer to [facility] for mammogram.            SUBJECTIVE:    HPI:  Rosemary Enamorado is a 42 y.o. female who presents to request removal of her IUD. It will be 5 years in January and she wante to make sure she got in with all the holidays coming up. It was placed after the birth of her twins, but she didn't realize it. She was discussing options later and felt the strings. She went to the doc and they told her she already had an IUD -- for this reason, she's not sure which IUD it is, but she was told 5 years. She has not had problems with the IUD. She is not sure what she will do for contraception after removal.  She is due for a pap. She has not fully menstruated since getting the IUD. Pt is requesting refills of aspirin. She was under the impression that it was for blood pressure, which has been elevated since maryse COVID-19 in July. She would also like more zinc, combined with excedrin she feels it has helped her headaches. Review of Systems   Constitutional: Negative for fever and malaise/fatigue.    Eyes: Negative for blurred vision. Respiratory: Negative for cough and shortness of breath. Cardiovascular: Negative for chest pain, palpitations and leg swelling. Gastrointestinal: Negative for abdominal pain, constipation, diarrhea, nausea and vomiting. Neurological: Positive for headaches. Negative for dizziness. BP (!) 147/78 (BP 1 Location: Right upper arm, BP Patient Position: Sitting, BP Cuff Size: Adult)   Pulse 65   Temp 97.3 °F (36.3 °C) (Temporal)   Resp 16   Ht 5' 1\" (1.549 m)   Wt 160 lb (72.6 kg)   SpO2 99%   BMI 30.23 kg/m²     Physical Exam  Constitutional:       General: She is not in acute distress. Appearance: Normal appearance. HENT:      Head: Normocephalic and atraumatic. Cardiovascular:      Rate and Rhythm: Normal rate and regular rhythm. Heart sounds: Normal heart sounds. Pulmonary:      Effort: Pulmonary effort is normal.      Breath sounds: Normal breath sounds. Neurological:      Mental Status: She is alert and oriented to person, place, and time.    Psychiatric:         Behavior: Behavior normal.

## 2021-11-23 NOTE — PROGRESS NOTES
I have printed the AVS and reviewed it with the patient today.  I have copied the provider's check out note here and have reviewed these items with the patient today: Meds sent to [facility], follow up next week for IUD removal.  Will Johnson RN

## 2021-11-30 ENCOUNTER — HOSPITAL ENCOUNTER (OUTPATIENT)
Dept: LAB | Age: 41
Discharge: HOME | End: 2021-11-30

## 2021-11-30 ENCOUNTER — OFFICE VISIT (OUTPATIENT)
Dept: FAMILY MEDICINE CLINIC | Age: 41
End: 2021-11-30

## 2021-11-30 VITALS
DIASTOLIC BLOOD PRESSURE: 85 MMHG | TEMPERATURE: 97.5 F | BODY MASS INDEX: 30.29 KG/M2 | WEIGHT: 160.4 LBS | HEART RATE: 81 BPM | OXYGEN SATURATION: 97 % | HEIGHT: 61 IN | SYSTOLIC BLOOD PRESSURE: 137 MMHG

## 2021-11-30 DIAGNOSIS — Z23 ENCOUNTER FOR IMMUNIZATION: ICD-10-CM

## 2021-11-30 DIAGNOSIS — N92.6 IRREGULAR MENSTRUATION: Primary | ICD-10-CM

## 2021-11-30 DIAGNOSIS — Z12.4 CERVICAL CANCER SCREENING: ICD-10-CM

## 2021-11-30 DIAGNOSIS — Z30.432 ENCOUNTER FOR IUD REMOVAL: ICD-10-CM

## 2021-11-30 PROCEDURE — 87624 HPV HI-RISK TYP POOLED RSLT: CPT

## 2021-11-30 PROCEDURE — 58301 REMOVE INTRAUTERINE DEVICE: CPT | Performed by: FAMILY MEDICINE

## 2021-11-30 PROCEDURE — 0001A COVID-19, MRNA, LNP-S, PF, 30MCG/0.3ML DOSE(PFIZER): CPT

## 2021-11-30 PROCEDURE — 99214 OFFICE O/P EST MOD 30 MIN: CPT | Performed by: FAMILY MEDICINE

## 2021-11-30 PROCEDURE — 91300 COVID-19, MRNA, LNP-S, PF, 30MCG/0.3ML DOSE(PFIZER): CPT

## 2021-11-30 PROCEDURE — 88175 CYTOPATH C/V AUTO FLUID REDO: CPT

## 2021-11-30 RX ORDER — LEVONORGESTREL AND ETHINYL ESTRADIOL 0.1-0.02MG
1 KIT ORAL DAILY
Qty: 90 TABLET | Refills: 3 | Status: SHIPPED | OUTPATIENT
Start: 2021-11-30 | End: 2022-11-25

## 2021-11-30 NOTE — PROGRESS NOTES
Coordination of Care  1. Have you been to the ER, urgent care clinic since your last visit? Hospitalized since your last visit? No    2. Have you seen or consulted any other health care providers outside of the [address] [place] [address] since your last visit? Include any pap smears or colon screening. No    Does the patient need refills? YES    Learning Assessment Complete?  yes  Depression Screening complete in the past 12 months? yes

## 2021-11-30 NOTE — PROGRESS NOTES
Rosy Reyna is a 42 y.o. female   Chief Complaint   Patient presents with    Removal Iud     Other     pt c/o itching, coughing and swelling on throat x3         ASSESSMENT AND PLAN:    1. Irregular menstruation  Will start OCP to regulate menstruation.  - levonorgestrel-ethinyl estradiol (AVIANE, ALESSE, LESSINA) 0.1-20 mg-mcg tab; Take 1 Tablet by mouth daily for 360 days. Dispense: 90 Tablet; Refill: 3    2. Encounter for IUD removal  Successful removal of intact IUD today. Pt tolerated procedure well. - REMOVE INTRAUTERINE DEVICE    3. Cervical cancer screening  Specimen sent to lab for cotesting. Follow-up per ASCCP guidelines. - PAP IG, APTIMA HPV AND RFX 16/18,45 ([id number]); Future    4. Encounter for immunization  - COVID-19, MRNA, LNP-S, PF, 30MCG/0.3ML DOSE(PFIZER)          SUBJECTIVE:    HPI:  Rosy Reyna is a 42 y.o. female who presents for IUD removal. It is due for removal in January. She has a history of irregular periods and dysmenorrhea which is one of the reasons she got it, and she would like to go on the pills in order to have a monthly period. She is due for a pap. She reports that since starting lisinopril she has had an itchy throat and a dry cough, but she thinks it's due to the weather change. She'd like to continue the lisinopril for now. Review of Systems   Constitutional: Negative. HENT: Positive for sore throat. Respiratory: Positive for cough. Cardiovascular: Negative. Gastrointestinal: Negative. Genitourinary: Negative. /85 (BP 1 Location: Left upper arm, BP Patient Position: Sitting)   Pulse 81   Temp 97.5 °F (36.4 °C) (Temporal)   Ht 5' 0.98\" (1.549 m)   Wt 160 lb 6.4 oz (72.8 kg)   SpO2 97%   BMI 30.32 kg/m²     Physical Exam  Constitutional:       Appearance: Normal appearance. She is not ill-appearing. HENT:      Mouth/Throat:      Pharynx: Posterior oropharyngeal erythema present.  No oropharyngeal exudate. Cardiovascular:      Rate and Rhythm: Normal rate and regular rhythm. Heart sounds: Normal heart sounds. Pulmonary:      Effort: Pulmonary effort is normal.      Breath sounds: Normal breath sounds. No wheezing or rhonchi. Genitourinary:     General: Normal vulva. Vagina: Normal.      Cervix: Friability and cervical bleeding present. Comments: Cervical ectropion. IUD strings visualized and grasped with ring forceps. Intact IUD removed easily with gentle downward traction and shown to patient. Lymphadenopathy:      Cervical: No cervical adenopathy. Neurological:      Mental Status: She is alert.

## 2021-11-30 NOTE — PROGRESS NOTES
0 I have printed AVS and reviewed it with patient today. I have copied the provider's check out note here and have reviewed these items with the patient today:  Check-out Note: [facility] Return in about 3 months (around 2/28/2022) for BP check. Patient verbalized understanding.  Romayne Leyland, RN

## 2021-11-30 NOTE — PROGRESS NOTES
The patient receive the COVID vaccine in the right deltoid. Lot # [id number]. No adverse reaction noted. The VIS was reviewed with the patient prior to administering and the patient was able to state her full date of birth .  Neli Johnson RN

## 2021-12-03 NOTE — PROGRESS NOTES
(H11.32) Conjunctival hemorrhage, left eye - Assesment : Examination revealed subconjunctival hemorrhage OS. - Plan : Monitor for changes. Advised patient that would take 1-2 weeks for redness to dissipate. Patient to call in with increased problems or decreased vision. Keep scheduled appt in November. Negative Pap/HPV. Repeat in 5 years. (Dec 2026)  Please inform the patient. Thanks.

## 2021-12-21 ENCOUNTER — IMMUNIZATION (OUTPATIENT)
Dept: FAMILY MEDICINE CLINIC | Age: 41
End: 2021-12-21

## 2021-12-21 DIAGNOSIS — Z23 ENCOUNTER FOR IMMUNIZATION: Primary | ICD-10-CM

## 2021-12-21 PROCEDURE — 91300 COVID-19, MRNA, LNP-S, PF, 30MCG/0.3ML DOSE(PFIZER): CPT

## 2021-12-21 PROCEDURE — 0002A COVID-19, MRNA, LNP-S, PF, 30MCG/0.3ML DOSE(PFIZER): CPT

## 2022-03-19 PROBLEM — U07.1 COVID-19 VIRUS INFECTION: Status: ACTIVE | Noted: 2020-07-04

## 2022-03-20 PROBLEM — R09.02 HYPOXIA: Status: ACTIVE | Noted: 2020-07-04

## 2022-11-15 DIAGNOSIS — N92.6 IRREGULAR MENSTRUATION: ICD-10-CM

## 2022-11-16 RX ORDER — LEVONORGESTREL AND ETHINYL ESTRADIOL 0.1-0.02MG
KIT ORAL
Qty: 90 TABLET | Refills: 0 | Status: SHIPPED | OUTPATIENT
Start: 2022-11-16

## 2022-12-13 ENCOUNTER — OFFICE VISIT (OUTPATIENT)
Dept: FAMILY MEDICINE CLINIC | Age: 42
End: 2022-12-13

## 2022-12-13 VITALS
HEIGHT: 60 IN | HEART RATE: 78 BPM | TEMPERATURE: 97.3 F | SYSTOLIC BLOOD PRESSURE: 163 MMHG | DIASTOLIC BLOOD PRESSURE: 102 MMHG | BODY MASS INDEX: 32.43 KG/M2 | WEIGHT: 165.2 LBS | OXYGEN SATURATION: 97 %

## 2022-12-13 DIAGNOSIS — Z23 NEEDS FLU SHOT: ICD-10-CM

## 2022-12-13 DIAGNOSIS — F51.01 PRIMARY INSOMNIA: ICD-10-CM

## 2022-12-13 DIAGNOSIS — I10 ESSENTIAL HYPERTENSION: Primary | ICD-10-CM

## 2022-12-13 DIAGNOSIS — N92.6 IRREGULAR MENSTRUATION: ICD-10-CM

## 2022-12-13 RX ORDER — AMITRIPTYLINE HYDROCHLORIDE 10 MG/1
10 TABLET, FILM COATED ORAL
Qty: 90 TABLET | Refills: 0 | Status: SHIPPED | OUTPATIENT
Start: 2022-12-13

## 2022-12-13 RX ORDER — LISINOPRIL 5 MG/1
5 TABLET ORAL DAILY
Qty: 90 TABLET | Refills: 0 | Status: SHIPPED | OUTPATIENT
Start: 2022-12-13

## 2022-12-13 NOTE — PROGRESS NOTES
Chief Complaint   Patient presents with    Hypertension     F/up; pt requesting lab work; pt state she has been getting HA's in the afternoon and thinks it's cause her pressure is high; pt was taking lisinopril but hasn't since may 2022     Visit Vitals  BP (!) 163/102 (BP 1 Location: Right upper arm, BP Patient Position: Sitting)   Pulse 78   Temp 97.3 °F (36.3 °C) (Temporal)   Ht 4' 11.8\" (1.519 m)   Wt 165 lb 3.2 oz (74.9 kg)   SpO2 97%   BMI 32.48 kg/m²     Coordination of Care  1. Have you been to the ER, urgent care clinic since your last visit? Hospitalized since your last visit? No    2. Have you seen or consulted any other health care providers outside of the [address] [place] [address] since your last visit? Include any pap smears or colon screening. No    Does the patient need refills? YES    Learning Assessment Complete?  yes  Depression Screening complete in the past 12 months? yes

## 2022-12-13 NOTE — PROGRESS NOTES
2022 : Jasmin Mullen (: 1980) is a 36 y.o. female,  established patient, here for evaluation of the following chief complaint(s):  Hypertension (F/up; pt requesting lab work; pt state she has been getting HA's in the afternoon and thinks it's cause her pressure is high; pt was taking lisinopril but hasn't since may 2022)     ASSESSMENT/PLAN:  Below is the assessment and plan developed based on review of pertinent history, physical exam, labs, studies, and medications. 1. Essential hypertension  -     lisinopriL (PRINIVIL, ZESTRIL) 5 mg tablet; Take 1 Tablet by mouth daily. , Normal, Disp-90 Tablet, R-0  2. Primary insomnia  -     amitriptyline (ELAVIL) 10 mg tablet; Take 1 Tablet by mouth nightly. For sleep., Normal, Disp-90 Tablet, R-0  Return for 4 wks LK HTN. SUBJECTIVE/OBJECTIVE:  HPI states has not taken bp med before. When she can't sleep well, she'll have a headache the next day. She might sleep 20 minutes in the day. At 9 p can't fall asleep. Then at 2 am awake again. Then not sleeping. Feels itching. 140/100 left arm    No results found for any visits on 22. Current Medications:   Current Outpatient Medications   Medication Sig    lisinopriL (PRINIVIL, ZESTRIL) 5 mg tablet Take 1 Tablet by mouth daily. levonorgestrel-ethinyl estradiol (AVIANE, ALESSE, LESSINA) 0.1-20 mg-mcg tab Take 1 tablet by mouth once daily    aspirin delayed-release 81 mg tablet One tablet PO once a day x 90 days    acetaminophen (TYLENOL) 325 mg tablet Take 2 Tabs by mouth every six (6) hours as needed for Pain or Fever. ascorbic acid, vitamin C, (VITAMIN C) 500 mg tablet Take 1 Tab by mouth two (2) times a day. amitriptyline (ELAVIL) 10 mg tablet Take 1 Tablet by mouth nightly. For sleep. Maribel Glaze - still taking them for her periods. Review of Systems: Negative for: fever, chest pain, shortness of breath, leg swelling.     Social History:  reports that she has never smoked. She has never used smokeless tobacco. She reports that she does not currently use alcohol. She reports that she does not currently use drugs. Physical Examination: No LMP recorded. (Menstrual status: IUD). Blood pressure (!) 163/102, pulse 78, temperature 97.3 °F (36.3 °C), temperature source Temporal, height 4' 11.8\" (1.519 m), weight 165 lb 3.2 oz (74.9 kg), SpO2 97 %. Integumentary - no rashes. General appearance - well developed, no acute distress. Chest - clear to auscultation. Heart - regular rate and rhythm without murmurs, rubs, or gallops. Abdomen - bowel sounds present x 4, NT, ND  Extremities - no CCE. An electronic signature was used to authenticate this note.   -- Kinsey Centeno, NP

## 2022-12-13 NOTE — PROGRESS NOTES
Patient discharged with AVS. Name and date of birth verified. Instructed to schedule a follow-up visit in 4 weeks. Made aware of prescriptions sent to the pharmacy. Medication review completed. Flu vaccine administered per patient's request after obtaining consent, confirming VIS understanding, and ruling out contraindications. Patient instructed about signs of allergic reactions and when to seek emergency care. Patient verbalized understanding. Patient tolerated vaccination well, no adverse effects noted. Recorded in EMR and VIIS. Patient instructed to stay in waiting area for 15 min for observation. Patient was discharged in stable condition after 15 minute waiting period. Patient was given an opportunity to voice questions/concerns. No questions at this time. [facility] interpretor #[id number] assisted.

## 2023-02-07 ENCOUNTER — OFFICE VISIT (OUTPATIENT)
Dept: FAMILY MEDICINE CLINIC | Age: 43
End: 2023-02-07

## 2023-02-07 VITALS
TEMPERATURE: 97.5 F | HEART RATE: 87 BPM | BODY MASS INDEX: 32.4 KG/M2 | OXYGEN SATURATION: 99 % | DIASTOLIC BLOOD PRESSURE: 86 MMHG | SYSTOLIC BLOOD PRESSURE: 138 MMHG | WEIGHT: 164.8 LBS

## 2023-02-07 DIAGNOSIS — I10 ESSENTIAL HYPERTENSION: ICD-10-CM

## 2023-02-07 DIAGNOSIS — N92.6 IRREGULAR MENSTRUATION: ICD-10-CM

## 2023-02-07 DIAGNOSIS — M79.10 MUSCLE PAIN: Primary | ICD-10-CM

## 2023-02-07 PROCEDURE — 99214 OFFICE O/P EST MOD 30 MIN: CPT | Performed by: NURSE PRACTITIONER

## 2023-02-07 PROCEDURE — 3074F SYST BP LT 130 MM HG: CPT | Performed by: NURSE PRACTITIONER

## 2023-02-07 PROCEDURE — 3078F DIAST BP <80 MM HG: CPT | Performed by: NURSE PRACTITIONER

## 2023-02-07 RX ORDER — LISINOPRIL 5 MG/1
5 TABLET ORAL DAILY
Qty: 90 TABLET | Refills: 1 | Status: SHIPPED | OUTPATIENT
Start: 2023-02-07

## 2023-02-07 RX ORDER — ASCORBIC ACID 500 MG
500 TABLET ORAL 2 TIMES DAILY
Qty: 60 TABLET | Refills: 0 | Status: SHIPPED | OUTPATIENT
Start: 2023-02-07

## 2023-02-07 RX ORDER — LEVONORGESTREL AND ETHINYL ESTRADIOL 0.1-0.02MG
1 KIT ORAL DAILY
Qty: 90 TABLET | Refills: 1 | Status: SHIPPED | OUTPATIENT
Start: 2023-02-07

## 2023-02-07 NOTE — PROGRESS NOTES
Chief Complaint   Patient presents with    Hypertension    Arm Pain     Left arm pain yesterday. Visit Vitals  BP (!) 155/95 (BP 1 Location: Left upper arm, BP Patient Position: Sitting, BP Cuff Size: Adult)   Pulse 87   Temp 97.5 °F (36.4 °C) (Temporal)   Wt 164 lb 12.8 oz (74.8 kg)   LMP 01/29/2023 (Within Days)   SpO2 99%   BMI 32.40 kg/m²     1. Have you been to the ER, urgent care clinic since your last visit? Hospitalized since your last visit? No    2. Have you seen or consulted any other health care providers outside of the [address] [place] [address] since your last visit? Include any pap smears or colon screening.  No

## 2023-02-07 NOTE — PROGRESS NOTES
2023 : Paula Marcos (: 1980) is a 36 y.o. female,  established patient, here for evaluation of the following chief complaint(s):  Hypertension and Arm Pain (Left arm pain yesterday.)   -appt for labs fasting - chol and glucose  Her dad has diabetes  ASSESSMENT/PLAN:  Below is the assessment and plan developed based on review of pertinent history, physical exam, labs, studies, and medications. 1. Muscle pain  2. Irregular menstruation  -     ascorbic acid, vitamin C, (VITAMIN C) 500 mg tablet; Take 1 Tablet by mouth two (2) times a day., Normal, Disp-60 Tablet, R-0  -     levonorgestrel-ethinyl estradiol (AVIANE, ALESSE, LESSINA) 0.1-20 mg-mcg tab; Take 1 Tablet by mouth daily. , Normal, Disp-90 Tablet, R-1  3. Essential hypertension  -     lisinopriL (PRINIVIL, ZESTRIL) 5 mg tablet; Take 1 Tablet by mouth daily. , Normal, Disp-90 Tablet, R-1  Labs ordered today. Return for return fasting for labs and LK 1 week later. SUBJECTIVE/OBJECTIVE:  HPI Hand numb, and pain up the forearm. Tylenol - helped. Current Medications:   Current Outpatient Medications   Medication Sig    ascorbic acid, vitamin C, (VITAMIN C) 500 mg tablet Take 1 Tablet by mouth two (2) times a day. levonorgestrel-ethinyl estradiol (AVIANE, ALESSE, LESSINA) 0.1-20 mg-mcg tab Take 1 Tablet by mouth daily. lisinopriL (PRINIVIL, ZESTRIL) 5 mg tablet Take 1 Tablet by mouth daily. amitriptyline (ELAVIL) 10 mg tablet Take 1 Tablet by mouth nightly. For sleep. aspirin delayed-release 81 mg tablet One tablet PO once a day x 90 days    acetaminophen (TYLENOL) 325 mg tablet Take 2 Tabs by mouth every six (6) hours as needed for Pain or Fever. Review of Systems: Negative for: fever, chest pain, shortness of breath, leg swelling. Social History:  reports that she has never smoked. She has never used smokeless tobacco. She reports that she does not currently use alcohol.  She reports that she does not currently use drugs. Physical Examination: Patient's last menstrual period was 01/29/2023 (within days). Blood pressure 138/86, pulse 87, temperature 97.5 °F (36.4 °C), temperature source Temporal, weight 164 lb 12.8 oz (74.8 kg), last menstrual period 01/29/2023, SpO2 99 %. Tenderness at brachioradialis of left forearm without injury. General appearance - well developed, no acute distress. Chest - clear to auscultation. Heart - regular rate and rhythm without murmurs, rubs, or gallops. Abdomen - bowel sounds present x 4, NT, ND  Extremities - no CCE. An electronic signature was used to authenticate this note.   -- Radha Holden, NP

## 2023-02-07 NOTE — PROGRESS NOTES
Verified name and . An AVS was printed and given to the patient. Reviewed all discharge instructions, including: continuing medications, possible side effects, goodRX coupons, and f/up appt. Allowed time for questions and patient verbalized understanding to all given instructions. Patient discharged from clinic in stable condition.

## 2023-02-16 ENCOUNTER — HOSPITAL ENCOUNTER (OUTPATIENT)
Dept: LAB | Age: 43
Discharge: HOME | End: 2023-02-16

## 2023-02-16 ENCOUNTER — LAB ONLY (OUTPATIENT)
Dept: FAMILY MEDICINE CLINIC | Age: 43
End: 2023-02-16

## 2023-02-16 DIAGNOSIS — I10 ESSENTIAL HYPERTENSION: ICD-10-CM

## 2023-02-16 PROCEDURE — 36415 COLL VENOUS BLD VENIPUNCTURE: CPT

## 2023-02-16 PROCEDURE — 80053 COMPREHEN METABOLIC PANEL: CPT

## 2023-02-16 PROCEDURE — 80061 LIPID PANEL: CPT

## 2023-02-17 LAB
ALBUMIN SERPL-MCNC: 3.8 G/DL (ref 3.5–5)
ALBUMIN/GLOB SERPL: 1.1 (ref 1.1–2.2)
ALP SERPL-CCNC: 142 U/L (ref 45–117)
ALT SERPL-CCNC: 89 U/L (ref 12–78)
ANION GAP SERPL CALC-SCNC: 8 MMOL/L (ref 5–15)
AST SERPL-CCNC: 66 U/L (ref 15–37)
BILIRUB SERPL-MCNC: 0.3 MG/DL (ref 0.2–1)
BUN SERPL-MCNC: 6 MG/DL (ref 6–20)
BUN/CREAT SERPL: 9 (ref 12–20)
CALCIUM SERPL-MCNC: 9.4 MG/DL (ref 8.5–10.1)
CHLORIDE SERPL-SCNC: 106 MMOL/L (ref 97–108)
CHOLEST SERPL-MCNC: 191 MG/DL
CO2 SERPL-SCNC: 27 MMOL/L (ref 21–32)
CREAT SERPL-MCNC: 0.66 MG/DL (ref 0.55–1.02)
GLOBULIN SER CALC-MCNC: 3.5 G/DL (ref 2–4)
GLUCOSE SERPL-MCNC: 105 MG/DL (ref 65–100)
HDLC SERPL-MCNC: 25 MG/DL
HDLC SERPL: 7.6 (ref 0–5)
LDLC SERPL CALC-MCNC: 113.2 MG/DL (ref 0–100)
POTASSIUM SERPL-SCNC: 4.3 MMOL/L (ref 3.5–5.1)
PROT SERPL-MCNC: 7.3 G/DL (ref 6.4–8.2)
SODIUM SERPL-SCNC: 141 MMOL/L (ref 136–145)
TRIGL SERPL-MCNC: 264 MG/DL (ref ?–150)
VLDLC SERPL CALC-MCNC: 52.8 MG/DL

## 2023-03-07 ENCOUNTER — OFFICE VISIT (OUTPATIENT)
Dept: FAMILY MEDICINE CLINIC | Age: 43
End: 2023-03-07

## 2023-03-07 VITALS
SYSTOLIC BLOOD PRESSURE: 144 MMHG | TEMPERATURE: 97.5 F | BODY MASS INDEX: 32.24 KG/M2 | WEIGHT: 164 LBS | OXYGEN SATURATION: 99 % | DIASTOLIC BLOOD PRESSURE: 90 MMHG | HEART RATE: 73 BPM | RESPIRATION RATE: 18 BRPM

## 2023-03-07 DIAGNOSIS — I10 ESSENTIAL HYPERTENSION: ICD-10-CM

## 2023-03-07 PROCEDURE — 99214 OFFICE O/P EST MOD 30 MIN: CPT | Performed by: NURSE PRACTITIONER

## 2023-03-07 PROCEDURE — 3077F SYST BP >= 140 MM HG: CPT | Performed by: NURSE PRACTITIONER

## 2023-03-07 PROCEDURE — 3079F DIAST BP 80-89 MM HG: CPT | Performed by: NURSE PRACTITIONER

## 2023-03-07 RX ORDER — LISINOPRIL 10 MG/1
10 TABLET ORAL DAILY
Qty: 90 TABLET | Refills: 0 | Status: SHIPPED | OUTPATIENT
Start: 2023-03-07

## 2023-03-07 NOTE — PROGRESS NOTES
Coordination of Care  1. Have you been to the ER, urgent care clinic since your last visit? Hospitalized since your last visit? No    2. Have you seen or consulted any other health care providers outside of the [address] [place] [address] since your last visit? Include any pap smears or colon screening. No    Does the patient need refills? NO    Learning Assessment Complete?  yes  Depression Screening complete in the past 12 months? yes    Chief Complaint   Patient presents with    Results     F/U to review lab results     Visit Vitals  BP (!) 154/90 (BP 1 Location: Right upper arm, BP Patient Position: Sitting, BP Cuff Size: Adult long)   Pulse 73   Temp 97.5 °F (36.4 °C) (Temporal)   Resp 18   Wt 164 lb (74.4 kg)   SpO2 99%   BMI 32.24 kg/m²

## 2023-03-07 NOTE — PROGRESS NOTES
[facility] L[facility] seen at d/c, full name and  verified, given After visit Summary and reviewed today's visit with patient along with instructions on when it is recommended to come back. I reviewed the medication list with the patient to ensure she knows how to and when to take her medications. Side effects, mechanisms of action and medication compliance were reiterated to ensure proper understanding. Low cholesterol diet reviewed with the patient and emphasized importance of exercise. I have reviewed the provider's instructions with the patient, answering all questions to her satisfaction. Patient verbalized understanding.   David Florez RN

## 2023-03-07 NOTE — PROGRESS NOTES
3/7/2023 : Shaina [facility] Brijesh (: 1980) is a 36 y.o. female,  established patient, here for evaluation of the following chief complaint(s):  Results (F/U to review lab results)     ASSESSMENT/PLAN:  Below is the assessment and plan developed based on review of pertinent history, physical exam, labs, studies, and medications. 1. Essential hypertension  -     lisinopriL (PRINIVIL, ZESTRIL) 10 mg tablet; Take 1 Tablet by mouth daily. , Normal, Disp-90 Tablet, R-0  Return for 3 months LK. Lisinopril increased to 10 mg from 5 mg. SUBJECTIVE/OBJECTIVE:  HPI   Labs including cholesterol reviewed. ASCVD score calculated and discussed. No results found for any visits on 23. Component      Latest Ref Rng & Units 2023   Sodium      136 - 145 mmol/L 141   Potassium      3.5 - 5.1 mmol/L 4.3   Chloride      97 - 108 mmol/L 106   CO2      21 - 32 mmol/L 27   Anion gap      5 - 15 mmol/L 8   Glucose      65 - 100 mg/dL 105 (H)   BUN      6 - 20 MG/DL 6   Creatinine      0.55 - 1.02 MG/DL 0.66   BUN/Creatinine ratio      12 - 20   9 (L)   eGFR      >60 ml/min/1.73m2 >60   Calcium      8.5 - 10.1 MG/DL 9.4   Bilirubin, total      0.2 - 1.0 MG/DL 0.3   ALT      12 - 78 U/L 89 (H)   AST      15 - 37 U/L 66 (H)   Alk. phosphatase      45 - 117 U/L 142 (H)   Protein, total      6.4 - 8.2 g/dL 7.3   Albumin      3.5 - 5.0 g/dL 3.8   Globulin      2.0 - 4.0 g/dL 3.5   A-G Ratio      1.1 - 2.2   1.1   Cholesterol, total      <200 MG/   Triglyceride      <150 MG/ (H)   HDL Cholesterol      MG/DL 25   LDL, calculated      0 - 100 MG/.2 (H)   VLDL, calculated      MG/DL 52.8   CHOL/HDL Ratio      0.0 - 5.0   7.6 (H)   Takes bcp.   The 10-year ASCVD risk score (Bello DK, et al., 2019) is: 4.3%    Values used to calculate the score:      Age: 36 years      Sex: Female      Is Non- : No      Diabetic: No      Tobacco smoker: No      Systolic Blood Pressure: 144 mmHg      Is BP treated: Yes      HDL Cholesterol: 25 MG/DL      Total Cholesterol: 191 MG/DL    Current Medications:   Current Outpatient Medications   Medication Sig    lisinopriL (PRINIVIL, ZESTRIL) 10 mg tablet Take 1 Tablet by mouth daily. ascorbic acid, vitamin C, (VITAMIN C) 500 mg tablet Take 1 Tablet by mouth two (2) times a day. levonorgestrel-ethinyl estradiol (AVIANE, ALESSE, LESSINA) 0.1-20 mg-mcg tab Take 1 Tablet by mouth daily. amitriptyline (ELAVIL) 10 mg tablet Take 1 Tablet by mouth nightly. For sleep. aspirin delayed-release 81 mg tablet One tablet PO once a day x 90 days    acetaminophen (TYLENOL) 325 mg tablet Take 2 Tabs by mouth every six (6) hours as needed for Pain or Fever. Last took lisinopril last night. 144/90    Review of Systems: Negative for: fever, chest pain, shortness of breath, leg swelling. Social History:  reports that she has never smoked. She has never used smokeless tobacco. She reports that she does not currently use alcohol. She reports that she does not currently use drugs. Physical Examination: Patient's last menstrual period was 02/15/2023 (exact date). Blood pressure (!) 144/90, pulse 73, temperature 97.5 °F (36.4 °C), temperature source Temporal, resp. rate 18, weight 164 lb (74.4 kg), last menstrual period 02/15/2023, SpO2 99 %. General appearance - well developed, no acute distress. Chest - clear to auscultation. Heart - regular rate and rhythm without murmurs, rubs, or gallops. Abdomen - bowel sounds present x 4, NT, ND  Extremities - no CCE. An electronic signature was used to authenticate this note.   -- Jose Martin Davis NP

## 2023-07-26 ENCOUNTER — OFFICE VISIT (OUTPATIENT)
Age: 43
End: 2023-07-26

## 2023-07-26 ENCOUNTER — HOSPITAL ENCOUNTER (OUTPATIENT)
Facility: HOSPITAL | Age: 43
Setting detail: SPECIMEN
Discharge: HOME | End: 2023-07-29

## 2023-07-26 VITALS
DIASTOLIC BLOOD PRESSURE: 74 MMHG | OXYGEN SATURATION: 95 % | WEIGHT: 161 LBS | SYSTOLIC BLOOD PRESSURE: 124 MMHG | BODY MASS INDEX: 31.65 KG/M2 | TEMPERATURE: 98.2 F | HEART RATE: 75 BPM

## 2023-07-26 DIAGNOSIS — R79.89 ELEVATED LFTS: ICD-10-CM

## 2023-07-26 DIAGNOSIS — I10 PRIMARY HYPERTENSION: Primary | ICD-10-CM

## 2023-07-26 PROCEDURE — 3078F DIAST BP <80 MM HG: CPT | Performed by: PHYSICIAN ASSISTANT

## 2023-07-26 PROCEDURE — 99213 OFFICE O/P EST LOW 20 MIN: CPT | Performed by: PHYSICIAN ASSISTANT

## 2023-07-26 PROCEDURE — 3074F SYST BP LT 130 MM HG: CPT | Performed by: PHYSICIAN ASSISTANT

## 2023-07-26 PROCEDURE — 36415 COLL VENOUS BLD VENIPUNCTURE: CPT

## 2023-07-26 PROCEDURE — 80053 COMPREHEN METABOLIC PANEL: CPT

## 2023-07-26 RX ORDER — LISINOPRIL AND HYDROCHLOROTHIAZIDE 20; 12.5 MG/1; MG/1
1 TABLET ORAL DAILY
Qty: 90 TABLET | Refills: 3 | Status: SHIPPED | OUTPATIENT
Start: 2023-07-26

## 2023-07-26 SDOH — ECONOMIC STABILITY: FOOD INSECURITY: WITHIN THE PAST 12 MONTHS, THE FOOD YOU BOUGHT JUST DIDN'T LAST AND YOU DIDN'T HAVE MONEY TO GET MORE.: NEVER TRUE

## 2023-07-26 SDOH — ECONOMIC STABILITY: HOUSING INSECURITY
IN THE LAST 12 MONTHS, WAS THERE A TIME WHEN YOU DID NOT HAVE A STEADY PLACE TO SLEEP OR SLEPT IN A SHELTER (INCLUDING NOW)?: NO

## 2023-07-26 SDOH — ECONOMIC STABILITY: TRANSPORTATION INSECURITY
IN THE PAST 12 MONTHS, HAS THE LACK OF TRANSPORTATION KEPT YOU FROM MEDICAL APPOINTMENTS OR FROM GETTING MEDICATIONS?: NO

## 2023-07-26 SDOH — ECONOMIC STABILITY: FOOD INSECURITY: WITHIN THE PAST 12 MONTHS, YOU WORRIED THAT YOUR FOOD WOULD RUN OUT BEFORE YOU GOT MONEY TO BUY MORE.: NEVER TRUE

## 2023-07-26 SDOH — ECONOMIC STABILITY: TRANSPORTATION INSECURITY
IN THE PAST 12 MONTHS, HAS LACK OF TRANSPORTATION KEPT YOU FROM MEETINGS, WORK, OR FROM GETTING THINGS NEEDED FOR DAILY LIVING?: NO

## 2023-07-26 SDOH — ECONOMIC STABILITY: INCOME INSECURITY: HOW HARD IS IT FOR YOU TO PAY FOR THE VERY BASICS LIKE FOOD, HOUSING, MEDICAL CARE, AND HEATING?: NOT VERY HARD

## 2023-07-26 ASSESSMENT — PATIENT HEALTH QUESTIONNAIRE - PHQ9
SUM OF ALL RESPONSES TO PHQ QUESTIONS 1-9: 0
1. LITTLE INTEREST OR PLEASURE IN DOING THINGS: 0
SUM OF ALL RESPONSES TO PHQ QUESTIONS 1-9: 0
SUM OF ALL RESPONSES TO PHQ9 QUESTIONS 1 & 2: 0
SUM OF ALL RESPONSES TO PHQ QUESTIONS 1-9: 0
SUM OF ALL RESPONSES TO PHQ QUESTIONS 1-9: 0
2. FEELING DOWN, DEPRESSED OR HOPELESS: 0

## 2023-07-26 NOTE — PROGRESS NOTES
Patient discharged with AVS. Patient's name and  verified. Patient made aware of prescription sent to the pharmacy. Medication review completed. GoodRx coupon given for the pharmacy. Patient instructed to schedule an appointment to return in 6 months. Patient given an opportunity to voice questions/concerns. All questions addressed. [facility] interpretor #[id number] assisted.

## 2023-07-26 NOTE — PROGRESS NOTES
Assessment/Plan:    Anton Millan was seen today for hypertension. Diagnoses and all orders for this visit:    Primary hypertension  -     lisinopril-hydroCHLOROthiazide (PRINZIDE;ZESTORETIC) 20-12.5 MG per tablet; Take 1 tablet by mouth daily    Elevated LFTs  -     Comprehensive Metabolic Panel; Future    4 lb weight loss recently, hoping the LFTs are back to normal   Diet and lifestyle discussed  Bp controlled on new med, no side effects to the meds noted     No follow-up provider specified. Yani Ngo PA-C  Anton Millan Nelida Kelley expressed understanding of this plan. An AVS was printed and given to the patient.      ----------------------------------------------------------------------    Chief Complaint   Patient presents with    Hypertension     Follow up. History of Present Illness:  Pt presents for f/up on HTN after starting a new med (was uncontrolled on lisinopril and so was started on lis/hctz). She has been taking the med as directed. She has not had any side effects to the meds. She feels great and is happy about her bp  She will start looking for ways to incorporate more fruit and vegetables and lean protein into her diet      No past medical history on file. Current Outpatient Medications   Medication Sig Dispense Refill    lisinopril-hydroCHLOROthiazide (PRINZIDE;ZESTORETIC) 20-12.5 MG per tablet Take 1 tablet by mouth daily 90 tablet 3    acetaminophen (TYLENOL) 325 MG tablet Take 2 tablets by mouth every 6 hours as needed      amitriptyline (ELAVIL) 10 MG tablet Take 1 tablet by mouth      ascorbic acid (VITAMIN C) 500 MG tablet Take 1 tablet by mouth 2 times daily      aspirin 81 MG EC tablet One tablet PO once a day x 90 days      levonorgestrel-ethinyl estradiol (AVIANE;ALESSE;LESSINA) 0.1-20 MG-MCG per tablet Take 1 tablet by mouth daily       No current facility-administered medications for this visit.        No Known Allergies    Social History     Tobacco Use    Smoking

## 2023-07-27 LAB
ALBUMIN SERPL-MCNC: 4.1 G/DL (ref 3.5–5)
ALBUMIN/GLOB SERPL: 1.1 (ref 1.1–2.2)
ALP SERPL-CCNC: 131 U/L (ref 45–117)
ALT SERPL-CCNC: 45 U/L (ref 12–78)
ANION GAP SERPL CALC-SCNC: 7 MMOL/L (ref 5–15)
AST SERPL-CCNC: 26 U/L (ref 15–37)
BILIRUB SERPL-MCNC: 0.3 MG/DL (ref 0.2–1)
BUN SERPL-MCNC: 9 MG/DL (ref 6–20)
BUN/CREAT SERPL: 14 (ref 12–20)
CALCIUM SERPL-MCNC: 9.2 MG/DL (ref 8.5–10.1)
CHLORIDE SERPL-SCNC: 104 MMOL/L (ref 97–108)
CO2 SERPL-SCNC: 27 MMOL/L (ref 21–32)
CREAT SERPL-MCNC: 0.63 MG/DL (ref 0.55–1.02)
GLOBULIN SER CALC-MCNC: 3.6 G/DL (ref 2–4)
GLUCOSE SERPL-MCNC: 104 MG/DL (ref 65–100)
POTASSIUM SERPL-SCNC: 3.7 MMOL/L (ref 3.5–5.1)
PROT SERPL-MCNC: 7.7 G/DL (ref 6.4–8.2)
SODIUM SERPL-SCNC: 138 MMOL/L (ref 136–145)

## 2024-02-12 ENCOUNTER — OFFICE VISIT (OUTPATIENT)
Age: 44
End: 2024-02-12

## 2024-02-12 VITALS
BODY MASS INDEX: 31.45 KG/M2 | DIASTOLIC BLOOD PRESSURE: 80 MMHG | SYSTOLIC BLOOD PRESSURE: 121 MMHG | OXYGEN SATURATION: 98 % | TEMPERATURE: 97.7 F | HEART RATE: 71 BPM | WEIGHT: 160 LBS

## 2024-02-12 DIAGNOSIS — I10 PRIMARY HYPERTENSION: ICD-10-CM

## 2024-02-12 PROCEDURE — 99213 OFFICE O/P EST LOW 20 MIN: CPT | Performed by: PHYSICIAN ASSISTANT

## 2024-02-12 PROCEDURE — 3079F DIAST BP 80-89 MM HG: CPT | Performed by: PHYSICIAN ASSISTANT

## 2024-02-12 PROCEDURE — 3074F SYST BP LT 130 MM HG: CPT | Performed by: PHYSICIAN ASSISTANT

## 2024-02-12 RX ORDER — LISINOPRIL AND HYDROCHLOROTHIAZIDE 20; 12.5 MG/1; MG/1
1 TABLET ORAL DAILY
Qty: 90 TABLET | Refills: 3 | Status: SHIPPED | OUTPATIENT
Start: 2024-02-12

## 2024-02-12 NOTE — PROGRESS NOTES
Assessment/Plan:    Aneta was seen today for hypertension.    Diagnoses and all orders for this visit:    Primary hypertension  -     lisinopril-hydroCHLOROthiazide (PRINZIDE;ZESTORETIC) 20-12.5 MG per tablet; Take 1 tablet by mouth daily        No follow-up provider specified.    Alla Ngo PA-C  Aneta Valentine expressed understanding of this plan. An AVS was printed and given to the patient.      ----------------------------------------------------------------------    Chief Complaint   Patient presents with    Hypertension     Follow up       History of Present Illness:  Pt presents for BP recheck  She is taking lis/hctz as rx'd  She is not having any side effects to the medication  She is not taking elavil anymore for help with insomnia. She still has insomnia but has noted that her sleep is improved on days that she exercises (she is walking with her  about 3 times a week)  She is taking BALA for birth control- will send her to health dept  She is due for mammo- send her to [facility]       No past medical history on file.    Current Outpatient Medications   Medication Sig Dispense Refill    lisinopril-hydroCHLOROthiazide (PRINZIDE;ZESTORETIC) 20-12.5 MG per tablet Take 1 tablet by mouth daily 90 tablet 3    acetaminophen (TYLENOL) 325 MG tablet Take 2 tablets by mouth every 6 hours as needed      ascorbic acid (VITAMIN C) 500 MG tablet Take 1 tablet by mouth 2 times daily      aspirin 81 MG EC tablet One tablet PO once a day x 90 days      levonorgestrel-ethinyl estradiol (AVIANE;ALESSE;LESSINA) 0.1-20 MG-MCG per tablet Take 1 tablet by mouth daily       No current facility-administered medications for this visit.       No Known Allergies    Social History     Tobacco Use    Smoking status: Never    Smokeless tobacco: Never   Substance Use Topics    Alcohol use: Not Currently    Drug use: Not Currently       No family history on file.    Physical Exam:     /80 (Site: Left Upper

## 2024-10-16 ENCOUNTER — OFFICE VISIT (OUTPATIENT)
Age: 44
End: 2024-10-16

## 2024-10-16 VITALS
OXYGEN SATURATION: 98 % | HEART RATE: 71 BPM | WEIGHT: 164 LBS | DIASTOLIC BLOOD PRESSURE: 85 MMHG | SYSTOLIC BLOOD PRESSURE: 137 MMHG | BODY MASS INDEX: 32.24 KG/M2 | TEMPERATURE: 97.7 F

## 2024-10-16 DIAGNOSIS — Z23 IMMUNIZATION DUE: Primary | ICD-10-CM

## 2024-10-16 DIAGNOSIS — I10 PRIMARY HYPERTENSION: ICD-10-CM

## 2024-10-16 PROCEDURE — 90656 IIV3 VACC NO PRSV 0.5 ML IM: CPT | Performed by: PHYSICIAN ASSISTANT

## 2024-10-16 PROCEDURE — 99213 OFFICE O/P EST LOW 20 MIN: CPT | Performed by: PHYSICIAN ASSISTANT

## 2024-10-16 PROCEDURE — 3079F DIAST BP 80-89 MM HG: CPT | Performed by: PHYSICIAN ASSISTANT

## 2024-10-16 PROCEDURE — 90471 IMMUNIZATION ADMIN: CPT | Performed by: PHYSICIAN ASSISTANT

## 2024-10-16 PROCEDURE — 3075F SYST BP GE 130 - 139MM HG: CPT | Performed by: PHYSICIAN ASSISTANT

## 2024-10-16 RX ORDER — LISINOPRIL AND HYDROCHLOROTHIAZIDE 12.5; 2 MG/1; MG/1
1 TABLET ORAL DAILY
Qty: 90 TABLET | Refills: 3 | Status: SHIPPED | OUTPATIENT
Start: 2024-10-16

## 2024-10-16 ASSESSMENT — PATIENT HEALTH QUESTIONNAIRE - PHQ9
SUM OF ALL RESPONSES TO PHQ QUESTIONS 1-9: 1
SUM OF ALL RESPONSES TO PHQ QUESTIONS 1-9: 1
SUM OF ALL RESPONSES TO PHQ9 QUESTIONS 1 & 2: 1
2. FEELING DOWN, DEPRESSED OR HOPELESS: SEVERAL DAYS
SUM OF ALL RESPONSES TO PHQ QUESTIONS 1-9: 1
1. LITTLE INTEREST OR PLEASURE IN DOING THINGS: NOT AT ALL
SUM OF ALL RESPONSES TO PHQ QUESTIONS 1-9: 1

## 2024-10-16 NOTE — PROGRESS NOTES
Aneta Valentine seen at discharge. Full name and  verified; After visit Summary was given and reviewed with patient. RN advised patient when provider recommends to return for follow-up visit in 6 months. RN reviewed the provider's instructions with the patient, including medication instructions. Patient verbalized her understanding and denies having any further questions at this time.   Mark Silva RN

## 2024-10-16 NOTE — PROGRESS NOTES
Aneta Valentine requests Flu vaccine. Provider cleared patient to receive vaccines today. Patient confirmed name and . Patient in agreement with receiving vaccines today. Patient denies fever, egg allergy, or reactions to any past immunization.  Immunization given per protocol and recorded in VIIS and EMR.  Vaccine Information Sheet given to patient and possible side effects explained.  Reviewed signs/symptoms of allergic reaction indicating the need to be seen in ER. Patient advised that if signs/symptoms of an allergic reaction develop, she should call 911 or have someone drive her to the hospital. Patient verbalized understanding. Vaccine administered in right deltoid. Patient had no adverse reaction at time of discharge.  Due to language barrier, an  ((Ellie)) assisted during this encounter.  SHELLI RAE RN

## 2024-10-18 ENCOUNTER — TELEPHONE (OUTPATIENT)
Age: 44
End: 2024-10-18

## 2024-10-18 NOTE — TELEPHONE ENCOUNTER
The  services used. Called pt about [facility] missed appt. Checking EPIC pt did not have past appt so pt given [facility] screening line number to call and make appt for screening mammogram, Pt states no breast issues. Pt has no further questions or concerns. Pt states she will call eligibility line and make her appt. CARLA MODI RN

## 2024-10-18 NOTE — TELEPHONE ENCOUNTER
[facility] received a call from patient requesting an appointment for a pap.     Previous Provider: candice zurita    When Diagnosed/Seen : unknown    Best day of the week for Gynecologist appointment? Scheduled mammo on 2/4/25    Do you prefer Morning or afternoon?    1st day of your last menstrual period? unknown    Are your periods regular?did not ask    \"Staff at [facility] will try their best to schedule the appointment base on your preferences, but is not guatanteed due to availability, they will call you with further details and instructions within 5 business days if you do not hear from them \"

## 2024-11-27 NOTE — TELEPHONE ENCOUNTER
Telephoned and spoke with patient ID [id number] discussed that we can perform the screening mammogram but per chart reviewed her pap smear is UTD. Pt confirmed that she has not had previous abnormal pap smears. Sylwia Hebert RN

## 2025-02-04 ENCOUNTER — OFFICE VISIT (OUTPATIENT)
Age: 45
End: 2025-02-04

## 2025-02-04 ENCOUNTER — HOSPITAL ENCOUNTER (OUTPATIENT)
Facility: HOSPITAL | Age: 45
Discharge: HOME | End: 2025-02-07

## 2025-02-04 DIAGNOSIS — Z12.31 VISIT FOR SCREENING MAMMOGRAM: ICD-10-CM

## 2025-02-04 DIAGNOSIS — Z01.419 ENCOUNTER FOR WELL WOMAN EXAM: Primary | ICD-10-CM

## 2025-02-04 PROCEDURE — 77063 BREAST TOMOSYNTHESIS BI: CPT

## 2025-02-04 NOTE — PROGRESS NOTES
EVERY WOMANS LIFE HISTORY QUESTIONNAIRE     used  [] No    [x]   Yes    Ht--5'5'    Wt--160lbs    Do you have any breast concerns today?  __yes sharp pain that comes and goes to only left breast, near nipple____    Are you experiencing any of the following?   No Yes Comments   Nipple Discharge [x]                                  []                                     Breast Lump/Masses [x]                                  []                                     Breast Skin Changes [x]                                  []                                           When and where was last mammogram performed?  ___first_     No Yes Comments   Have you ever had a mammogram that required additional follow up?  [x]     []        Have you ever had a breast biopsy? [x]                                  []                                     Do you have breast implants?  [x]        []                When and where was your last Pap test performed? ___11/30/2021 NIL/HPV-_    Have you ever had an abnormal Pap test? ( Please note, if Hx of Colposcopy, LEEP, CKC, JACOBY 2 or 3)  No Yes Comments   [x]                                  []                                       Have you been through menopause?   No Yes Date of LMP   [x]                                  []                                  1/25/2025     For patients who are still menstruating: Do you use any form of family planning? BCP    Have you had a hysterectomy?   No Yes Comments (why)   [x]                                  []                                        No Yes Comments   Vaginal Discharge [x]                                  []                                     Abnormal/unusual vaginal bleeding    (Post menopausal  []                                  [x]                                  Sees in the am when using bathroom. Pt notes on toilet paper, brown color, \"like coca cola\".   Only see in the am. Sometimes pelvic pain.like cramps, Comes out

## 2025-02-04 NOTE — PROGRESS NOTES
Assessment/Plan:    Aneta \"Aneta Montoya\" was seen today for ewl.    Diagnoses and all orders for this visit:    Encounter for well woman exam        No follow-up provider specified.    Alla Ngo PA-C  Aneta Valentine expressed understanding of this plan. An AVS was printed and given to the patient.      ----------------------------------------------------------------------    Chief Complaint   Patient presents with    EWL       History of Present Illness:  EWL annual well woman visit  She is UTD on pap  She is having periods  She has no risk for DV    No breast concerns or complaints       No past medical history on file.    Current Outpatient Medications   Medication Sig Dispense Refill    lisinopril-hydroCHLOROthiazide (PRINZIDE;ZESTORETIC) 20-12.5 MG per tablet Take 1 tablet by mouth daily 90 tablet 3    acetaminophen (TYLENOL) 325 MG tablet Take 2 tablets by mouth every 6 hours as needed      ascorbic acid (VITAMIN C) 500 MG tablet Take 1 tablet by mouth 2 times daily      aspirin 81 MG EC tablet One tablet PO once a day x 90 days      levonorgestrel-ethinyl estradiol (AVIANE;ALESSE;LESSINA) 0.1-20 MG-MCG per tablet Take 1 tablet by mouth daily       No current facility-administered medications for this visit.       No Known Allergies    Social History     Tobacco Use    Smoking status: Never     Passive exposure: Never    Smokeless tobacco: Never   Substance Use Topics    Alcohol use: Not Currently    Drug use: Not Currently       Family History   Problem Relation Age of Onset    Breast Cancer Mother     Ovarian Cancer Mother        Physical Exam:     LMP 2025     A&Ox3  WDWN NAD  Respirations normal and non labored  Breast exam- sugey neg for mass, tenderness, skin color changes, dimpling or retractions

## 2025-02-11 ENCOUNTER — TELEPHONE (OUTPATIENT)
Age: 45
End: 2025-02-11

## 2025-02-11 VITALS — BODY MASS INDEX: 33.06 KG/M2 | HEIGHT: 59 IN | WEIGHT: 164 LBS

## 2025-02-11 NOTE — TELEPHONE ENCOUNTER
ADITYA (International Breast Cancer Intervation Study) Online zhane-clovis model breast cancer risk evaluation tool resulted in 17.59 %. EMRE FRANCIS RN

## 2025-04-18 ENCOUNTER — OFFICE VISIT (OUTPATIENT)
Age: 45
End: 2025-04-18

## 2025-04-18 ENCOUNTER — HOSPITAL ENCOUNTER (OUTPATIENT)
Facility: HOSPITAL | Age: 45
Setting detail: SPECIMEN
Discharge: HOME | End: 2025-04-21

## 2025-04-18 VITALS
WEIGHT: 165.4 LBS | TEMPERATURE: 97.6 F | HEART RATE: 77 BPM | BODY MASS INDEX: 33.41 KG/M2 | SYSTOLIC BLOOD PRESSURE: 123 MMHG | DIASTOLIC BLOOD PRESSURE: 70 MMHG | OXYGEN SATURATION: 100 %

## 2025-04-18 DIAGNOSIS — I10 ESSENTIAL HYPERTENSION: ICD-10-CM

## 2025-04-18 DIAGNOSIS — Z23 IMMUNIZATION DUE: ICD-10-CM

## 2025-04-18 DIAGNOSIS — L56.8 PHOTOSENSITIVITY DERMATITIS: ICD-10-CM

## 2025-04-18 DIAGNOSIS — L56.8 PHOTOSENSITIVITY DERMATITIS: Primary | ICD-10-CM

## 2025-04-18 DIAGNOSIS — Z12.11 COLON CANCER SCREENING: ICD-10-CM

## 2025-04-18 PROCEDURE — 90471 IMMUNIZATION ADMIN: CPT | Performed by: FAMILY MEDICINE

## 2025-04-18 PROCEDURE — 85025 COMPLETE CBC W/AUTO DIFF WBC: CPT

## 2025-04-18 PROCEDURE — 83036 HEMOGLOBIN GLYCOSYLATED A1C: CPT

## 2025-04-18 PROCEDURE — 3078F DIAST BP <80 MM HG: CPT | Performed by: FAMILY MEDICINE

## 2025-04-18 PROCEDURE — 3074F SYST BP LT 130 MM HG: CPT | Performed by: FAMILY MEDICINE

## 2025-04-18 PROCEDURE — 90715 TDAP VACCINE 7 YRS/> IM: CPT | Performed by: FAMILY MEDICINE

## 2025-04-18 PROCEDURE — 80061 LIPID PANEL: CPT

## 2025-04-18 PROCEDURE — 99214 OFFICE O/P EST MOD 30 MIN: CPT | Performed by: FAMILY MEDICINE

## 2025-04-18 PROCEDURE — 80053 COMPREHEN METABOLIC PANEL: CPT

## 2025-04-18 RX ORDER — LISINOPRIL 40 MG/1
40 TABLET ORAL DAILY
Qty: 90 TABLET | Refills: 1 | Status: SHIPPED | OUTPATIENT
Start: 2025-04-18

## 2025-04-18 SDOH — ECONOMIC STABILITY: FOOD INSECURITY: WITHIN THE PAST 12 MONTHS, THE FOOD YOU BOUGHT JUST DIDN'T LAST AND YOU DIDN'T HAVE MONEY TO GET MORE.: NEVER TRUE

## 2025-04-18 SDOH — ECONOMIC STABILITY: FOOD INSECURITY: WITHIN THE PAST 12 MONTHS, YOU WORRIED THAT YOUR FOOD WOULD RUN OUT BEFORE YOU GOT MONEY TO BUY MORE.: NEVER TRUE

## 2025-04-18 ASSESSMENT — SOCIAL DETERMINANTS OF HEALTH (SDOH)
WITHIN THE LAST YEAR, HAVE YOU BEEN KICKED, HIT, SLAPPED, OR OTHERWISE PHYSICALLY HURT BY YOUR PARTNER OR EX-PARTNER?: NO
WITHIN THE LAST YEAR, HAVE YOU BEEN AFRAID OF YOUR PARTNER OR EX-PARTNER?: NO
WITHIN THE LAST YEAR, HAVE TO BEEN RAPED OR FORCED TO HAVE ANY KIND OF SEXUAL ACTIVITY BY YOUR PARTNER OR EX-PARTNER?: NO
WITHIN THE LAST YEAR, HAVE YOU BEEN HUMILIATED OR EMOTIONALLY ABUSED IN OTHER WAYS BY YOUR PARTNER OR EX-PARTNER?: NO

## 2025-04-18 ASSESSMENT — PATIENT HEALTH QUESTIONNAIRE - PHQ9
1. LITTLE INTEREST OR PLEASURE IN DOING THINGS: NOT AT ALL
SUM OF ALL RESPONSES TO PHQ QUESTIONS 1-9: 0
2. FEELING DOWN, DEPRESSED OR HOPELESS: NOT AT ALL

## 2025-04-18 ASSESSMENT — ENCOUNTER SYMPTOMS
COUGH: 0
ABDOMINAL PAIN: 0
CONSTIPATION: 0
DIARRHEA: 0
SHORTNESS OF BREATH: 0
NAUSEA: 0

## 2025-04-18 ASSESSMENT — LIFESTYLE VARIABLES
HOW MANY STANDARD DRINKS CONTAINING ALCOHOL DO YOU HAVE ON A TYPICAL DAY: PATIENT DOES NOT DRINK
HOW OFTEN DO YOU HAVE A DRINK CONTAINING ALCOHOL: NEVER

## 2025-04-18 NOTE — PROGRESS NOTES
session code 93992   Chief Complaint   Patient presents with    Hypertension     Follow up     Rash     When she goes outside in the sun she gets a rash. She has a picture of the rash with her today.        Vitals:    04/18/25 0813   BP: 123/70   BP Site: Left Upper Arm   Patient Position: Sitting   Pulse: 77   Temp: 97.6 °F (36.4 °C)   TempSrc: Temporal   SpO2: 100%   Weight: 75 kg (165 lb 6.4 oz)         \"Have you been to the ER, urgent care clinic since your last visit?  Hospitalized since your last visit?\"    NO    “Have you seen or consulted any other health care providers outside our system since your last visit?”    NO      “Have you had a colorectal cancer screening such as a colonoscopy/FIT/Cologuard?    NO    No colonoscopy on file  No cologuard on file  No FIT/FOBT on file   No flexible sigmoidoscopy on file

## 2025-04-18 NOTE — PROGRESS NOTES
Aneta Valentine is a 44 y.o. female   Chief Complaint   Patient presents with    Hypertension     Follow up     Rash     When she goes outside in the sun she gets a rash. She has a picture of the rash with her today.      ASSESSMENT AND PLAN:    1. Photosensitivity dermatitis  Possibly related to the HCTZ. No other offending drugs.  Low possibility for pellagra.  Start with discontinuing HCTZ.  Patient should wear mineral sunscreen whenever she goes outside on sunexposed skin.  - CBC with Auto Differential; Future    2. Essential hypertension  Controlled on lisinopril-hctz 20-12.5, but we'll have to stop the HCTZ.  Increase lisinopril only to 40mg.  Labs today  - Comprehensive Metabolic Panel; Future  - Hemoglobin A1C; Future  - Lipid Panel; Future  - lisinopril (PRINIVIL;ZESTRIL) 40 MG tablet; Take 1 tablet by mouth daily  Dispense: 90 tablet; Refill: 1    3. Immunization due  - Tdap, BOOSTRIX, (age 10 yrs+), IM    4. Colon cancer screening  - Occult Blood Stool Immunoassay; Future    SUBJECTIVE:    HPI:  Aneta Valentine is a 44 y.o. female who presents  for HTN followup.  Last seen Oct 2024, on lisinopril-hctz 20-12.5    Sometimes she gets headaches if she doesn't get enough sleep but otherwise feeling well.  No  dizziness, chest pain, palpitations, SOB, VILLALTA, peripheral edema.    She has been getting hives/blisters/erythema on her chest and neck after she goes out into the sun.It burns. She has a picture. She'll buy an OTC antifungal that does help calm the rash but it's very expensive.  Denies taking NSAIDs.  No recent antibiotics    Review of Systems   Constitutional:  Negative for fatigue, fever and unexpected weight change.   Eyes:  Negative for visual disturbance.   Respiratory:  Negative for cough and shortness of breath.    Cardiovascular:  Negative for chest pain and palpitations.   Gastrointestinal:  Negative for abdominal pain, constipation, diarrhea and nausea.   Skin:  Positive for

## 2025-04-18 NOTE — PROGRESS NOTES
Aneta Valentine seen at d/c, full name and  verified. After Visit Summary provided and all discharge instructions reviewed w/pt. Instructed pt to schedule a f/u appt in 3 months for HTN, photosensitivity. Medication list reviewed and education provided. GoodRx coupon provided and explained how to use. Reviewed provider's orders w/ pt: stop Lisinopril-HCTZ, start taking Lisinopril only. Pt verbalizes understanding. Recommended the use of Suncreen.     FIT test kit provided to patient, full instructions were reviewed with patient along with contents of kit and how to pack the sample, emphasized the importance of returning stool sample to clinic as soon as possible, within 24 hrs of collection. Informed pt she may drop off stool sample at [facility] (M-F 8:30am- 3:00pm- no appt necessary). Patient verbalizes understanding. TDaP vaccine ordered. [facility] Immunization Information System (VIIS) and consent reviewed. Patient denies fever, allergies, and previous immunization reactions. VIS provided and  possible side effects explained, including those which would warrant emergent evaluation. Advised patient to wait 15 minutes after vaccine administration to monitor for adverse reactions, patient verbalizes understanding. Immunization administered by this RN per order and recorded in VIIS and EMR. No adverse reactions noted at time of discharge.      --- Edel Martin RN

## 2025-04-19 LAB
ALBUMIN SERPL-MCNC: 4.1 G/DL (ref 3.5–5)
ALBUMIN/GLOB SERPL: 1.2 (ref 1.1–2.2)
ALP SERPL-CCNC: 134 U/L (ref 45–117)
ALT SERPL-CCNC: 65 U/L (ref 12–78)
ANION GAP SERPL CALC-SCNC: 5 MMOL/L (ref 2–12)
AST SERPL-CCNC: 72 U/L (ref 15–37)
BASOPHILS # BLD: 0.05 K/UL (ref 0–0.1)
BASOPHILS NFR BLD: 0.6 % (ref 0–1)
BILIRUB SERPL-MCNC: 0.4 MG/DL (ref 0.2–1)
BUN SERPL-MCNC: 8 MG/DL (ref 6–20)
BUN/CREAT SERPL: 11 (ref 12–20)
CALCIUM SERPL-MCNC: 9.5 MG/DL (ref 8.5–10.1)
CHLORIDE SERPL-SCNC: 104 MMOL/L (ref 97–108)
CHOLEST SERPL-MCNC: 194 MG/DL
CO2 SERPL-SCNC: 26 MMOL/L (ref 21–32)
CREAT SERPL-MCNC: 0.74 MG/DL (ref 0.55–1.02)
DIFFERENTIAL METHOD BLD: NORMAL
EOSINOPHIL # BLD: 0.09 K/UL (ref 0–0.4)
EOSINOPHIL NFR BLD: 1.2 % (ref 0–7)
ERYTHROCYTE [DISTWIDTH] IN BLOOD BY AUTOMATED COUNT: 12.8 % (ref 11.5–14.5)
EST. AVERAGE GLUCOSE BLD GHB EST-MCNC: 174 MG/DL
GLOBULIN SER CALC-MCNC: 3.4 G/DL (ref 2–4)
GLUCOSE SERPL-MCNC: 157 MG/DL (ref 65–100)
HBA1C MFR BLD: 7.7 % (ref 4–5.6)
HCT VFR BLD AUTO: 43.9 % (ref 35–47)
HDLC SERPL-MCNC: 29 MG/DL
HDLC SERPL: 6.7 (ref 0–5)
HGB BLD-MCNC: 13.8 G/DL (ref 11.5–16)
IMM GRANULOCYTES # BLD AUTO: 0.03 K/UL (ref 0–0.04)
IMM GRANULOCYTES NFR BLD AUTO: 0.4 % (ref 0–0.5)
LDLC SERPL CALC-MCNC: 112 MG/DL (ref 0–100)
LYMPHOCYTES # BLD: 2.36 K/UL (ref 0.8–3.5)
LYMPHOCYTES NFR BLD: 30.5 % (ref 12–49)
MCH RBC QN AUTO: 29.7 PG (ref 26–34)
MCHC RBC AUTO-ENTMCNC: 31.4 G/DL (ref 30–36.5)
MCV RBC AUTO: 94.4 FL (ref 80–99)
MONOCYTES # BLD: 0.4 K/UL (ref 0–1)
MONOCYTES NFR BLD: 5.2 % (ref 5–13)
NEUTS SEG # BLD: 4.8 K/UL (ref 1.8–8)
NEUTS SEG NFR BLD: 62.1 % (ref 32–75)
NRBC # BLD: 0 K/UL (ref 0–0.01)
NRBC BLD-RTO: 0 PER 100 WBC
PLATELET # BLD AUTO: 385 K/UL (ref 150–400)
PMV BLD AUTO: 9.8 FL (ref 8.9–12.9)
POTASSIUM SERPL-SCNC: 4 MMOL/L (ref 3.5–5.1)
PROT SERPL-MCNC: 7.5 G/DL (ref 6.4–8.2)
RBC # BLD AUTO: 4.65 M/UL (ref 3.8–5.2)
SODIUM SERPL-SCNC: 135 MMOL/L (ref 136–145)
TRIGL SERPL-MCNC: 265 MG/DL
VLDLC SERPL CALC-MCNC: 53 MG/DL
WBC # BLD AUTO: 7.7 K/UL (ref 3.6–11)

## 2025-04-21 ENCOUNTER — TELEPHONE (OUTPATIENT)
Age: 45
End: 2025-04-21

## 2025-04-21 ENCOUNTER — RESULTS FOLLOW-UP (OUTPATIENT)
Age: 45
End: 2025-04-21

## 2025-04-21 DIAGNOSIS — E11.9 NEWLY DIAGNOSED DIABETES (HCC): Primary | ICD-10-CM

## 2025-04-21 NOTE — TELEPHONE ENCOUNTER
Calling patient to discuss lab results.  Most notable, A1C was 7.7 - diagnostic of diabetes.  Her LDL is 112.  Her AST is 72.  Her LFTs were regularly elevated in the past, but had normalized at her last blood draw.     Normal CBC.        1. Newly diagnosed diabetes (HCC)  Start metformin daily with dinner for 2 weeks, then titrate up to BID.  We have followup scheduled already in July.  - metFORMIN (GLUCOPHAGE) 500 MG tablet; Take 1 tablet by mouth Daily with supper for 14 days, THEN 1 tablet 2 times daily (with meals).  Dispense: 166 tablet; Refill: 0

## 2025-04-24 ENCOUNTER — LAB (OUTPATIENT)
Age: 45
End: 2025-04-24

## 2025-04-24 ENCOUNTER — HOSPITAL ENCOUNTER (OUTPATIENT)
Facility: HOSPITAL | Age: 45
Setting detail: SPECIMEN
Discharge: HOME | End: 2025-04-27

## 2025-04-24 DIAGNOSIS — Z12.11 COLON CANCER SCREENING: ICD-10-CM

## 2025-04-24 PROCEDURE — 82274 ASSAY TEST FOR BLOOD FECAL: CPT

## 2025-04-24 NOTE — PROGRESS NOTES
Patient presented to clinic for drop off of stool specimen. Lab collected: FIT   Patient made aware that results will either be notified by letter or phone call. Patient verbalized understanding.   Silviano Gongora RN

## 2025-04-26 LAB — HEMOCCULT STL QL IA: POSITIVE

## 2025-04-28 ENCOUNTER — RESULTS FOLLOW-UP (OUTPATIENT)
Age: 45
End: 2025-04-28

## 2025-04-28 DIAGNOSIS — R19.5 POSITIVE FIT (FECAL IMMUNOCHEMICAL TEST): Primary | ICD-10-CM

## 2025-04-28 NOTE — RESULT ENCOUNTER NOTE
Please let the pt know that her stool test indicated that there is some blood in her stool.  She will need to have a colonscopy.  I am placing the referral to GI via [facility] now.

## 2025-04-28 NOTE — TELEPHONE ENCOUNTER
Phone call made to patient, name and  verified. Lab results were reviewed with patient per provider's note \"stool test indicated that there is some blood in her stool.\"  Per provider, patient will need to have a colonoscopy. Patient was advised that she will be contacted by a [facility] Coordinator in regards to her GI referral. Patient made aware that [facility] has its own financial screening. Patient denies any questions, verbalizes understanding.     Edel Martin RN

## 2025-04-28 NOTE — TELEPHONE ENCOUNTER
Session code- 84896. Tc to the pt. The pt verified her name and . The pt was given her entire lab message from the provider and the medication was explained to the pt. She had not picked up the Lisinopril yet because the Dr had told her to stop it. The pt's chart was reviewed. The Lisinopril a new Rx was written on 25. The progress note was read. The pt was taking Lisinopril - HCTZ. The progress note stated to stop the HCTZ, the order was for Lisinopril by itself. This was explained to the pt. The Metformin was explained to the pt. The pt was encouraged to go today to  the medications, and start taking them before the Pharmacy puts them back on the shelf. The pt stated she would go pick them up today. Shavonne Marina RN

## 2025-05-07 ENCOUNTER — OFFICE VISIT (OUTPATIENT)
Age: 45
End: 2025-05-07

## 2025-05-07 DIAGNOSIS — Z71.89 COUNSELING AND COORDINATION OF CARE: Primary | ICD-10-CM

## 2025-05-07 NOTE — PROGRESS NOTES
Patient did not bring needed documents to complete [facility] financial screening. Patient has been scheduled for [place] on 5/13/25 to complete financial screening.

## 2025-05-13 ENCOUNTER — OFFICE VISIT (OUTPATIENT)
Age: 45
End: 2025-05-13

## 2025-05-13 DIAGNOSIS — Z71.89 COUNSELING AND COORDINATION OF CARE: Primary | ICD-10-CM

## 2025-05-13 NOTE — PROGRESS NOTES
[facility] financial screening is complete. Patient has been instructed to call AN appointment line on or after 6/3/25 to be scheduled with specialist.

## 2025-07-18 ENCOUNTER — OFFICE VISIT (OUTPATIENT)
Age: 45
End: 2025-07-18

## 2025-07-18 ENCOUNTER — HOSPITAL ENCOUNTER (OUTPATIENT)
Facility: HOSPITAL | Age: 45
Setting detail: SPECIMEN
Discharge: HOME | End: 2025-07-21

## 2025-07-18 VITALS
HEART RATE: 79 BPM | DIASTOLIC BLOOD PRESSURE: 82 MMHG | OXYGEN SATURATION: 97 % | SYSTOLIC BLOOD PRESSURE: 146 MMHG | TEMPERATURE: 98.3 F | WEIGHT: 161.8 LBS | BODY MASS INDEX: 32.68 KG/M2

## 2025-07-18 DIAGNOSIS — E11.9 TYPE 2 DIABETES MELLITUS WITHOUT COMPLICATION, WITHOUT LONG-TERM CURRENT USE OF INSULIN (HCC): ICD-10-CM

## 2025-07-18 DIAGNOSIS — I10 ESSENTIAL HYPERTENSION: ICD-10-CM

## 2025-07-18 DIAGNOSIS — G47.00 INSOMNIA, UNSPECIFIED TYPE: ICD-10-CM

## 2025-07-18 DIAGNOSIS — I10 ESSENTIAL HYPERTENSION: Primary | ICD-10-CM

## 2025-07-18 PROCEDURE — 99214 OFFICE O/P EST MOD 30 MIN: CPT | Performed by: FAMILY MEDICINE

## 2025-07-18 PROCEDURE — 3051F HG A1C>EQUAL 7.0%<8.0%: CPT | Performed by: FAMILY MEDICINE

## 2025-07-18 PROCEDURE — 80061 LIPID PANEL: CPT

## 2025-07-18 PROCEDURE — 83036 HEMOGLOBIN GLYCOSYLATED A1C: CPT

## 2025-07-18 PROCEDURE — 83721 ASSAY OF BLOOD LIPOPROTEIN: CPT

## 2025-07-18 PROCEDURE — 3077F SYST BP >= 140 MM HG: CPT | Performed by: FAMILY MEDICINE

## 2025-07-18 PROCEDURE — 82043 UR ALBUMIN QUANTITATIVE: CPT

## 2025-07-18 PROCEDURE — 3079F DIAST BP 80-89 MM HG: CPT | Performed by: FAMILY MEDICINE

## 2025-07-18 PROCEDURE — 82570 ASSAY OF URINE CREATININE: CPT

## 2025-07-18 PROCEDURE — 80053 COMPREHEN METABOLIC PANEL: CPT

## 2025-07-18 RX ORDER — TRAZODONE HYDROCHLORIDE 50 MG/1
TABLET ORAL
Qty: 30 TABLET | Refills: 2 | Status: SHIPPED | OUTPATIENT
Start: 2025-07-18

## 2025-07-18 RX ORDER — ATENOLOL 25 MG/1
25 TABLET ORAL DAILY
Qty: 90 TABLET | Refills: 1 | Status: SHIPPED | OUTPATIENT
Start: 2025-07-18

## 2025-07-18 ASSESSMENT — ENCOUNTER SYMPTOMS
NAUSEA: 0
SHORTNESS OF BREATH: 0
ABDOMINAL PAIN: 0
CONSTIPATION: 0
COUGH: 0
DIARRHEA: 0

## 2025-07-18 ASSESSMENT — PATIENT HEALTH QUESTIONNAIRE - PHQ9
1. LITTLE INTEREST OR PLEASURE IN DOING THINGS: NOT AT ALL
SUM OF ALL RESPONSES TO PHQ QUESTIONS 1-9: 0
2. FEELING DOWN, DEPRESSED OR HOPELESS: NOT AT ALL
SUM OF ALL RESPONSES TO PHQ QUESTIONS 1-9: 0

## 2025-07-18 NOTE — PROGRESS NOTES
Aneta Valentine is a 45 y.o. female   Chief Complaint   Patient presents with    Follow-up     HTN F/U      ASSESSMENT AND PLAN:    1. Essential hypertension  Above goal.  Add atenolol 25mg.  I initially was going to add amlodipine, but patient mentioned that sometimes her heart races, so we'll use a beta blocker instead.    - atenolol (TENORMIN) 25 MG tablet; Take 1 tablet by mouth daily  Dispense: 90 tablet; Refill: 1  - Comprehensive Metabolic Panel; Future    2. Type 2 diabetes mellitus without complication, without long-term current use of insulin ([facility])  Repeat A1C today.  Adjust metformin if needed.  - Hemoglobin A1C; Future  - Comprehensive Metabolic Panel; Future  - Lipid Panel; Future  - Albumin/Creatinine Ratio, Urine; Future  - metFORMIN (GLUCOPHAGE) 500 MG tablet; Take 1 tablet by mouth 2 times daily (with meals)  Dispense: 180 tablet; Refill: 1    3. Insomnia, unspecified type  Trial trazodone. Discussed self titration (start at 1 tab (50mg), can decrease to 1/2 tab if too sedating. Can increase to 1.5 or 2 tabs if ineffective)  - traZODone (DESYREL) 50 MG tablet; Take 1/2 to 2 tabs QHS PRN for sleep.  Dispense: 30 tablet; Refill: 2    SUBJECTIVE:    HPI:  Aneta Valentine is a 45 y.o. female who presents for HTN, dermatitis and new DM2 followup.  Today she is \"Worried and happy\"  Happy because she doesn't have cancer. (After hearing about her + FIT test she thought it meant she had cancer. She had a normal colonoscopy)  Worried because she isn't sure what I\"m going to tell her today.    They went to [place] and she didn't have her photosensitive rash after stopping HCTZ.    She has had some headaches and dizziness.  Sometimes having pain in her feet and some ankle swelling.    She has been having trouble sleeping.  She was prescribed a medication for sleep but she had too much daytime fatigue/somnolence.    We reviewed her diabetes diagnosis.  She has been compliant with 
Chief Complaint   Patient presents with    Follow-up     HTN F/U       with [employer] services:  Language: Armenian   ID: #[id number]   Name: Carmelita    Patient name and date of birth verified by .  Patient given an after visit summary, reviewed medications on how and when to take, coupons given to present to pharmacy for medication discount.  Advised to schedule next appointment before leaving clinic office.  Patient verbalized understanding of all information given at time of visit. Shaunna Pompa LPN      
Have you been to the ER, urgent care clinic since your last visit?  Hospitalized since your last visit?   NO    Have you seen or consulted any other health care providers outside our system since your last visit?   NO             
regular rate and rhythm
regular rate and rhythm

## 2025-07-19 LAB
ALBUMIN SERPL-MCNC: 3.9 G/DL (ref 3.5–5)
ALBUMIN/GLOB SERPL: 1.2 (ref 1.1–2.2)
ALP SERPL-CCNC: 123 U/L (ref 45–117)
ALT SERPL-CCNC: 105 U/L (ref 12–78)
ANION GAP SERPL CALC-SCNC: 4 MMOL/L (ref 2–12)
AST SERPL-CCNC: 87 U/L (ref 15–37)
BILIRUB SERPL-MCNC: 0.3 MG/DL (ref 0.2–1)
BUN SERPL-MCNC: 8 MG/DL (ref 6–20)
BUN/CREAT SERPL: 11 (ref 12–20)
CALCIUM SERPL-MCNC: 9.5 MG/DL (ref 8.5–10.1)
CHLORIDE SERPL-SCNC: 105 MMOL/L (ref 97–108)
CO2 SERPL-SCNC: 26 MMOL/L (ref 21–32)
CREAT SERPL-MCNC: 0.72 MG/DL (ref 0.55–1.02)
CREAT UR-MCNC: 183 MG/DL
EST. AVERAGE GLUCOSE BLD GHB EST-MCNC: 166 MG/DL
GLOBULIN SER CALC-MCNC: 3.3 G/DL (ref 2–4)
GLUCOSE SERPL-MCNC: 221 MG/DL (ref 65–100)
HBA1C MFR BLD: 7.4 % (ref 4–5.6)
MICROALBUMIN UR-MCNC: 1.64 MG/DL
MICROALBUMIN/CREAT UR-RTO: 9 MG/G (ref 0–30)
POTASSIUM SERPL-SCNC: 3.7 MMOL/L (ref 3.5–5.1)
PROT SERPL-MCNC: 7.2 G/DL (ref 6.4–8.2)
SODIUM SERPL-SCNC: 135 MMOL/L (ref 136–145)

## 2025-07-20 LAB
CHOLEST SERPL-MCNC: 238 MG/DL
HDLC SERPL-MCNC: 25 MG/DL
HDLC SERPL: 9.5 (ref 0–5)
LDLC SERPL CALC-MCNC: ABNORMAL MG/DL (ref 0–100)
LDLC SERPL DIRECT ASSAY-MCNC: 162 MG/DL (ref 0–100)
TRIGL SERPL-MCNC: 475 MG/DL
VLDLC SERPL CALC-MCNC: ABNORMAL MG/DL